# Patient Record
Sex: FEMALE | Race: WHITE | NOT HISPANIC OR LATINO | Employment: OTHER | URBAN - METROPOLITAN AREA
[De-identification: names, ages, dates, MRNs, and addresses within clinical notes are randomized per-mention and may not be internally consistent; named-entity substitution may affect disease eponyms.]

---

## 2019-02-07 ENCOUNTER — TRANSCRIBE ORDERS (OUTPATIENT)
Dept: LAB | Facility: CLINIC | Age: 84
End: 2019-02-07

## 2019-02-07 ENCOUNTER — APPOINTMENT (OUTPATIENT)
Dept: LAB | Facility: CLINIC | Age: 84
End: 2019-02-07

## 2019-02-07 DIAGNOSIS — Z01.84 IMMUNITY STATUS TESTING: Primary | ICD-10-CM

## 2019-02-07 DIAGNOSIS — Z11.1 SCREENING EXAMINATION FOR PULMONARY TUBERCULOSIS: ICD-10-CM

## 2019-02-07 DIAGNOSIS — Z01.84 IMMUNITY STATUS TESTING: ICD-10-CM

## 2019-02-07 LAB — RUBV IGG SERPL IA-ACNC: >175 IU/ML

## 2019-02-07 PROCEDURE — 36415 COLL VENOUS BLD VENIPUNCTURE: CPT

## 2019-02-07 PROCEDURE — 86787 VARICELLA-ZOSTER ANTIBODY: CPT

## 2019-02-07 PROCEDURE — 86765 RUBEOLA ANTIBODY: CPT

## 2019-02-07 PROCEDURE — 86762 RUBELLA ANTIBODY: CPT

## 2019-02-07 PROCEDURE — 86735 MUMPS ANTIBODY: CPT

## 2019-02-07 PROCEDURE — 86480 TB TEST CELL IMMUN MEASURE: CPT

## 2019-02-11 LAB
GAMMA INTERFERON BACKGROUND BLD IA-ACNC: 0.04 IU/ML
M TB IFN-G BLD-IMP: NEGATIVE
M TB IFN-G CD4+ BCKGRND COR BLD-ACNC: 0 IU/ML
M TB IFN-G CD4+ BCKGRND COR BLD-ACNC: 0 IU/ML
MITOGEN IGNF BCKGRD COR BLD-ACNC: >10 IU/ML
VZV IGG SER IA-ACNC: NORMAL

## 2019-02-12 LAB
MEV IGG SER QL: NORMAL
MUV IGG SER QL: NORMAL

## 2021-01-25 ENCOUNTER — IMMUNIZATIONS (OUTPATIENT)
Dept: FAMILY MEDICINE CLINIC | Facility: HOSPITAL | Age: 86
End: 2021-01-25

## 2021-01-25 DIAGNOSIS — Z23 ENCOUNTER FOR IMMUNIZATION: Primary | ICD-10-CM

## 2021-01-25 PROCEDURE — 91301 SARS-COV-2 / COVID-19 MRNA VACCINE (MODERNA) 100 MCG: CPT

## 2021-01-25 PROCEDURE — 0011A SARS-COV-2 / COVID-19 MRNA VACCINE (MODERNA) 100 MCG: CPT

## 2021-02-20 ENCOUNTER — IMMUNIZATIONS (OUTPATIENT)
Dept: FAMILY MEDICINE CLINIC | Facility: HOSPITAL | Age: 86
End: 2021-02-20

## 2021-02-20 DIAGNOSIS — Z23 ENCOUNTER FOR IMMUNIZATION: Primary | ICD-10-CM

## 2021-02-20 PROCEDURE — 0012A SARS-COV-2 / COVID-19 MRNA VACCINE (MODERNA) 100 MCG: CPT

## 2021-02-20 PROCEDURE — 91301 SARS-COV-2 / COVID-19 MRNA VACCINE (MODERNA) 100 MCG: CPT

## 2022-01-03 DIAGNOSIS — Z11.59 SCREENING FOR VIRAL DISEASE: Primary | ICD-10-CM

## 2022-01-03 PROCEDURE — U0005 INFEC AGEN DETEC AMPLI PROBE: HCPCS | Performed by: INTERNAL MEDICINE

## 2022-01-03 PROCEDURE — U0003 INFECTIOUS AGENT DETECTION BY NUCLEIC ACID (DNA OR RNA); SEVERE ACUTE RESPIRATORY SYNDROME CORONAVIRUS 2 (SARS-COV-2) (CORONAVIRUS DISEASE [COVID-19]), AMPLIFIED PROBE TECHNIQUE, MAKING USE OF HIGH THROUGHPUT TECHNOLOGIES AS DESCRIBED BY CMS-2020-01-R: HCPCS | Performed by: INTERNAL MEDICINE

## 2022-12-08 ENCOUNTER — APPOINTMENT (OUTPATIENT)
Dept: LAB | Facility: CLINIC | Age: 87
End: 2022-12-08

## 2022-12-08 DIAGNOSIS — J06.9 ACUTE RESPIRATORY DISEASE: ICD-10-CM

## 2022-12-09 LAB
FLUAV RNA RESP QL NAA+PROBE: NEGATIVE
FLUBV RNA RESP QL NAA+PROBE: NEGATIVE
SARS-COV-2 RNA RESP QL NAA+PROBE: NEGATIVE

## 2023-03-10 ENCOUNTER — HOSPITAL ENCOUNTER (OUTPATIENT)
Dept: ULTRASOUND IMAGING | Facility: HOSPITAL | Age: 88
Discharge: HOME | End: 2023-03-10

## 2023-03-10 DIAGNOSIS — N39.3 FEMALE STRESS INCONTINENCE: ICD-10-CM

## 2023-10-23 ENCOUNTER — OFFICE VISIT (OUTPATIENT)
Dept: UROLOGY | Facility: CLINIC | Age: 88
End: 2023-10-23
Payer: MEDICARE

## 2023-10-23 VITALS
RESPIRATION RATE: 18 BRPM | SYSTOLIC BLOOD PRESSURE: 132 MMHG | HEART RATE: 112 BPM | WEIGHT: 142 LBS | OXYGEN SATURATION: 97 % | DIASTOLIC BLOOD PRESSURE: 82 MMHG | HEIGHT: 59 IN | BODY MASS INDEX: 28.63 KG/M2

## 2023-10-23 DIAGNOSIS — R32 URINARY INCONTINENCE, UNSPECIFIED TYPE: Primary | ICD-10-CM

## 2023-10-23 LAB
SL AMB  POCT GLUCOSE, UA: NORMAL
SL AMB LEUKOCYTE ESTERASE,UA: NORMAL
SL AMB POCT BILIRUBIN,UA: NORMAL
SL AMB POCT BLOOD,UA: NORMAL
SL AMB POCT CLARITY,UA: CLEAR
SL AMB POCT COLOR,UA: YELLOW
SL AMB POCT KETONES,UA: NORMAL
SL AMB POCT NITRITE,UA: NORMAL
SL AMB POCT PH,UA: 5
SL AMB POCT SPECIFIC GRAVITY,UA: 1.01
SL AMB POCT URINE PROTEIN: NORMAL
SL AMB POCT UROBILINOGEN: 0.2

## 2023-10-23 PROCEDURE — 81002 URINALYSIS NONAUTO W/O SCOPE: CPT | Performed by: PHYSICIAN ASSISTANT

## 2023-10-23 PROCEDURE — 99203 OFFICE O/P NEW LOW 30 MIN: CPT | Performed by: PHYSICIAN ASSISTANT

## 2023-10-23 RX ORDER — METOPROLOL SUCCINATE 25 MG/1
25 TABLET, EXTENDED RELEASE ORAL 2 TIMES DAILY
COMMUNITY

## 2023-10-23 RX ORDER — ASPIRIN 81 MG/1
81 TABLET ORAL DAILY
COMMUNITY

## 2023-10-23 RX ORDER — SIMVASTATIN 40 MG
40 TABLET ORAL
COMMUNITY

## 2023-10-23 RX ORDER — ASCORBIC ACID 500 MG
500 TABLET ORAL DAILY
COMMUNITY

## 2023-12-29 ENCOUNTER — HOSPITAL ENCOUNTER (OUTPATIENT)
Dept: MRI IMAGING | Facility: HOSPITAL | Age: 88
End: 2023-12-29
Attending: INTERNAL MEDICINE
Payer: MEDICARE

## 2023-12-29 ENCOUNTER — HOSPITAL ENCOUNTER (OUTPATIENT)
Dept: MRI IMAGING | Facility: HOSPITAL | Age: 88
End: 2023-12-29
Payer: MEDICARE

## 2023-12-29 ENCOUNTER — HOSPITAL ENCOUNTER (EMERGENCY)
Facility: HOSPITAL | Age: 88
End: 2023-12-29
Attending: EMERGENCY MEDICINE
Payer: MEDICARE

## 2023-12-29 ENCOUNTER — HOSPITAL ENCOUNTER (INPATIENT)
Facility: HOSPITAL | Age: 88
LOS: 2 days | Discharge: HOME | End: 2023-12-31
Attending: INTERNAL MEDICINE | Admitting: INTERNAL MEDICINE
Payer: MEDICARE

## 2023-12-29 VITALS
DIASTOLIC BLOOD PRESSURE: 64 MMHG | TEMPERATURE: 98.1 F | OXYGEN SATURATION: 98 % | RESPIRATION RATE: 16 BRPM | HEART RATE: 64 BPM | SYSTOLIC BLOOD PRESSURE: 148 MMHG

## 2023-12-29 DIAGNOSIS — R94.31 ABNORMAL ELECTROCARDIOGRAM (ECG) (EKG): ICD-10-CM

## 2023-12-29 DIAGNOSIS — G45.9 INTERMITTENT CEREBRAL ISCHEMIA: ICD-10-CM

## 2023-12-29 DIAGNOSIS — I63.9 STROKE (HCC): Primary | ICD-10-CM

## 2023-12-29 DIAGNOSIS — I48.91 ATRIAL FIBRILLATION (HCC): ICD-10-CM

## 2023-12-29 PROBLEM — I49.3 VENTRICULAR PREMATURE DEPOLARIZATION: Status: ACTIVE | Noted: 2020-01-14

## 2023-12-29 PROBLEM — E78.5 HYPERLIPIDEMIA: Status: ACTIVE | Noted: 2020-01-14

## 2023-12-29 PROBLEM — I35.9 AORTIC VALVE DISORDER: Status: ACTIVE | Noted: 2020-01-14

## 2023-12-29 PROBLEM — I10 ESSENTIAL HYPERTENSION: Status: ACTIVE | Noted: 2020-01-14

## 2023-12-29 PROBLEM — I27.20 PULMONARY HYPERTENSION (HCC): Status: ACTIVE | Noted: 2020-01-14

## 2023-12-29 LAB
ANION GAP SERPL CALCULATED.3IONS-SCNC: 7 MMOL/L
APTT PPP: 29 SECONDS (ref 23–37)
BASOPHILS # BLD AUTO: 0.02 THOUSANDS/ÂΜL (ref 0–0.1)
BASOPHILS NFR BLD AUTO: 0 % (ref 0–1)
BUN SERPL-MCNC: 20 MG/DL (ref 5–25)
CALCIUM SERPL-MCNC: 10.2 MG/DL (ref 8.4–10.2)
CHLORIDE SERPL-SCNC: 102 MMOL/L (ref 96–108)
CO2 SERPL-SCNC: 30 MMOL/L (ref 21–32)
CREAT SERPL-MCNC: 1.1 MG/DL (ref 0.6–1.3)
EOSINOPHIL # BLD AUTO: 0.05 THOUSAND/ÂΜL (ref 0–0.61)
EOSINOPHIL NFR BLD AUTO: 1 % (ref 0–6)
ERYTHROCYTE [DISTWIDTH] IN BLOOD BY AUTOMATED COUNT: 13.1 % (ref 11.6–15.1)
GFR SERPL CREATININE-BSD FRML MDRD: 45 ML/MIN/1.73SQ M
GLUCOSE SERPL-MCNC: 94 MG/DL (ref 65–140)
HCT VFR BLD AUTO: 41.2 % (ref 34.8–46.1)
HGB BLD-MCNC: 13.3 G/DL (ref 11.5–15.4)
IMM GRANULOCYTES # BLD AUTO: 0.02 THOUSAND/UL (ref 0–0.2)
IMM GRANULOCYTES NFR BLD AUTO: 0 % (ref 0–2)
INR PPP: 1.01 (ref 0.84–1.19)
LYMPHOCYTES # BLD AUTO: 1.72 THOUSANDS/ÂΜL (ref 0.6–4.47)
LYMPHOCYTES NFR BLD AUTO: 28 % (ref 14–44)
MAGNESIUM SERPL-MCNC: 2.1 MG/DL (ref 1.9–2.7)
MCH RBC QN AUTO: 31.9 PG (ref 26.8–34.3)
MCHC RBC AUTO-ENTMCNC: 32.3 G/DL (ref 31.4–37.4)
MCV RBC AUTO: 99 FL (ref 82–98)
MONOCYTES # BLD AUTO: 0.81 THOUSAND/ÂΜL (ref 0.17–1.22)
MONOCYTES NFR BLD AUTO: 13 % (ref 4–12)
NEUTROPHILS # BLD AUTO: 3.58 THOUSANDS/ÂΜL (ref 1.85–7.62)
NEUTS SEG NFR BLD AUTO: 58 % (ref 43–75)
NRBC BLD AUTO-RTO: 0 /100 WBCS
PLATELET # BLD AUTO: 162 THOUSANDS/UL (ref 149–390)
PMV BLD AUTO: 10 FL (ref 8.9–12.7)
POTASSIUM SERPL-SCNC: 4.2 MMOL/L (ref 3.5–5.3)
PROTHROMBIN TIME: 13.2 SECONDS (ref 11.6–14.5)
RBC # BLD AUTO: 4.17 MILLION/UL (ref 3.81–5.12)
SODIUM SERPL-SCNC: 139 MMOL/L (ref 135–147)
WBC # BLD AUTO: 6.2 THOUSAND/UL (ref 4.31–10.16)

## 2023-12-29 PROCEDURE — 85610 PROTHROMBIN TIME: CPT | Performed by: EMERGENCY MEDICINE

## 2023-12-29 PROCEDURE — 36415 COLL VENOUS BLD VENIPUNCTURE: CPT | Performed by: EMERGENCY MEDICINE

## 2023-12-29 PROCEDURE — 83735 ASSAY OF MAGNESIUM: CPT | Performed by: EMERGENCY MEDICINE

## 2023-12-29 PROCEDURE — 93005 ELECTROCARDIOGRAM TRACING: CPT

## 2023-12-29 PROCEDURE — 99285 EMERGENCY DEPT VISIT HI MDM: CPT | Performed by: EMERGENCY MEDICINE

## 2023-12-29 PROCEDURE — 85730 THROMBOPLASTIN TIME PARTIAL: CPT | Performed by: EMERGENCY MEDICINE

## 2023-12-29 PROCEDURE — 99223 1ST HOSP IP/OBS HIGH 75: CPT

## 2023-12-29 PROCEDURE — 80048 BASIC METABOLIC PNL TOTAL CA: CPT | Performed by: EMERGENCY MEDICINE

## 2023-12-29 PROCEDURE — 83036 HEMOGLOBIN GLYCOSYLATED A1C: CPT

## 2023-12-29 PROCEDURE — 99285 EMERGENCY DEPT VISIT HI MDM: CPT

## 2023-12-29 PROCEDURE — 85025 COMPLETE CBC W/AUTO DIFF WBC: CPT | Performed by: EMERGENCY MEDICINE

## 2023-12-29 PROCEDURE — 70544 MR ANGIOGRAPHY HEAD W/O DYE: CPT

## 2023-12-29 RX ORDER — SENNOSIDES 8.6 MG
1 TABLET ORAL DAILY
Status: DISCONTINUED | OUTPATIENT
Start: 2023-12-30 | End: 2023-12-31 | Stop reason: HOSPADM

## 2023-12-29 RX ORDER — PRAVASTATIN SODIUM 80 MG/1
80 TABLET ORAL
Status: DISCONTINUED | OUTPATIENT
Start: 2023-12-30 | End: 2023-12-31 | Stop reason: HOSPADM

## 2023-12-29 RX ORDER — ACETAMINOPHEN 325 MG/1
650 TABLET ORAL EVERY 6 HOURS PRN
Status: DISCONTINUED | OUTPATIENT
Start: 2023-12-29 | End: 2023-12-31 | Stop reason: HOSPADM

## 2023-12-29 RX ORDER — ASCORBIC ACID 500 MG
500 TABLET ORAL DAILY
Status: DISCONTINUED | OUTPATIENT
Start: 2023-12-30 | End: 2023-12-31 | Stop reason: HOSPADM

## 2023-12-29 RX ORDER — METOPROLOL SUCCINATE 25 MG/1
25 TABLET, EXTENDED RELEASE ORAL 2 TIMES DAILY
Status: DISCONTINUED | OUTPATIENT
Start: 2023-12-29 | End: 2023-12-31

## 2023-12-29 RX ORDER — ENOXAPARIN SODIUM 100 MG/ML
40 INJECTION SUBCUTANEOUS DAILY
Status: DISCONTINUED | OUTPATIENT
Start: 2023-12-30 | End: 2023-12-30 | Stop reason: DRUGHIGH

## 2023-12-29 RX ORDER — ONDANSETRON 2 MG/ML
4 INJECTION INTRAMUSCULAR; INTRAVENOUS EVERY 6 HOURS PRN
Status: DISCONTINUED | OUTPATIENT
Start: 2023-12-29 | End: 2023-12-31 | Stop reason: HOSPADM

## 2023-12-29 RX ORDER — MELATONIN
1000 DAILY
Status: DISCONTINUED | OUTPATIENT
Start: 2023-12-30 | End: 2023-12-31 | Stop reason: HOSPADM

## 2023-12-29 RX ORDER — MAGNESIUM HYDROXIDE/ALUMINUM HYDROXICE/SIMETHICONE 120; 1200; 1200 MG/30ML; MG/30ML; MG/30ML
30 SUSPENSION ORAL EVERY 6 HOURS PRN
Status: DISCONTINUED | OUTPATIENT
Start: 2023-12-29 | End: 2023-12-31 | Stop reason: HOSPADM

## 2023-12-29 RX ADMIN — METOPROLOL SUCCINATE 25 MG: 25 TABLET, EXTENDED RELEASE ORAL at 23:42

## 2023-12-30 ENCOUNTER — APPOINTMENT (INPATIENT)
Dept: VASCULAR ULTRASOUND | Facility: HOSPITAL | Age: 88
End: 2023-12-30
Payer: MEDICARE

## 2023-12-30 ENCOUNTER — APPOINTMENT (INPATIENT)
Dept: MRI IMAGING | Facility: HOSPITAL | Age: 88
End: 2023-12-30
Payer: MEDICARE

## 2023-12-30 ENCOUNTER — APPOINTMENT (INPATIENT)
Dept: NON INVASIVE DIAGNOSTICS | Facility: HOSPITAL | Age: 88
End: 2023-12-30
Payer: MEDICARE

## 2023-12-30 PROBLEM — G45.9 TIA (TRANSIENT ISCHEMIC ATTACK): Status: ACTIVE | Noted: 2023-12-30

## 2023-12-30 PROBLEM — I48.91 ATRIAL FIBRILLATION (HCC): Status: ACTIVE | Noted: 2023-12-30

## 2023-12-30 PROBLEM — I63.9 STROKE (HCC): Status: ACTIVE | Noted: 2023-12-30

## 2023-12-30 PROBLEM — R47.1 DYSARTHRIA: Status: ACTIVE | Noted: 2023-12-30

## 2023-12-30 LAB
ALBUMIN SERPL BCP-MCNC: 4.1 G/DL (ref 3.5–5)
ALP SERPL-CCNC: 56 U/L (ref 34–104)
ALT SERPL W P-5'-P-CCNC: 21 U/L (ref 7–52)
ANION GAP SERPL CALCULATED.3IONS-SCNC: 8 MMOL/L
AORTIC ROOT: 2.3 CM
AORTIC VALVE MEAN VELOCITY: 15.4 M/S
APICAL FOUR CHAMBER EJECTION FRACTION: 64 %
ASCENDING AORTA: 2.9 CM
AST SERPL W P-5'-P-CCNC: 25 U/L (ref 13–39)
ATRIAL RATE: 62 BPM
AV AREA BY CONTINUOUS VTI: 1.8 CM2
AV AREA PEAK VELOCITY: 1.6 CM2
AV LVOT MEAN GRADIENT: 3 MMHG
AV LVOT PEAK GRADIENT: 6 MMHG
AV MEAN GRADIENT: 11 MMHG
AV PEAK GRADIENT: 21 MMHG
AV VALVE AREA: 1.83 CM2
AV VELOCITY RATIO: 0.52
BASOPHILS # BLD AUTO: 0.04 THOUSANDS/ÂΜL (ref 0–0.1)
BASOPHILS NFR BLD AUTO: 1 % (ref 0–1)
BILIRUB SERPL-MCNC: 0.68 MG/DL (ref 0.2–1)
BUN SERPL-MCNC: 20 MG/DL (ref 5–25)
CALCIUM SERPL-MCNC: 9.4 MG/DL (ref 8.4–10.2)
CHLORIDE SERPL-SCNC: 105 MMOL/L (ref 96–108)
CHOLEST SERPL-MCNC: 164 MG/DL
CO2 SERPL-SCNC: 27 MMOL/L (ref 21–32)
CREAT SERPL-MCNC: 0.94 MG/DL (ref 0.6–1.3)
DOP CALC AO PEAK VEL: 2.27 M/S
DOP CALC AO VTI: 42.15 CM
DOP CALC LVOT AREA: 3.14 CM2
DOP CALC LVOT CARDIAC INDEX: 3.15 L/MIN/M2
DOP CALC LVOT CARDIAC OUTPUT: 5.14 L/MIN
DOP CALC LVOT DIAMETER: 2 CM
DOP CALC LVOT PEAK VEL VTI: 24.61 CM
DOP CALC LVOT PEAK VEL: 1.17 M/S
DOP CALC LVOT STROKE INDEX: 44.8 ML/M2
DOP CALC LVOT STROKE VOLUME: 77.28
E WAVE DECELERATION TIME: 190 MS
E/A RATIO: 1.47
EOSINOPHIL # BLD AUTO: 0.07 THOUSAND/ÂΜL (ref 0–0.61)
EOSINOPHIL NFR BLD AUTO: 1 % (ref 0–6)
ERYTHROCYTE [DISTWIDTH] IN BLOOD BY AUTOMATED COUNT: 13.2 % (ref 11.6–15.1)
EST. AVERAGE GLUCOSE BLD GHB EST-MCNC: 126 MG/DL
EST. AVERAGE GLUCOSE BLD GHB EST-MCNC: 128 MG/DL
FRACTIONAL SHORTENING: 29 (ref 28–44)
GFR SERPL CREATININE-BSD FRML MDRD: 54 ML/MIN/1.73SQ M
GLUCOSE SERPL-MCNC: 105 MG/DL (ref 65–140)
HBA1C MFR BLD: 6 %
HBA1C MFR BLD: 6.1 %
HCT VFR BLD AUTO: 41.9 % (ref 34.8–46.1)
HDLC SERPL-MCNC: 50 MG/DL
HGB BLD-MCNC: 13.6 G/DL (ref 11.5–15.4)
IMM GRANULOCYTES # BLD AUTO: 0.02 THOUSAND/UL (ref 0–0.2)
IMM GRANULOCYTES NFR BLD AUTO: 0 % (ref 0–2)
INTERVENTRICULAR SEPTUM IN DIASTOLE (PARASTERNAL SHORT AXIS VIEW): 0.9 CM
INTERVENTRICULAR SEPTUM: 0.9 CM (ref 0.6–1.1)
LAAS-AP2: 10.1 CM2
LAAS-AP4: 12.3 CM2
LDLC SERPL CALC-MCNC: 83 MG/DL (ref 0–100)
LEFT ATRIUM SIZE: 3.5 CM
LEFT ATRIUM VOLUME (MOD BIPLANE): 25 ML
LEFT ATRIUM VOLUME INDEX (MOD BIPLANE): 15.3 ML/M2
LEFT INTERNAL DIMENSION IN SYSTOLE: 2.2 CM (ref 2.1–4)
LEFT VENTRICULAR INTERNAL DIMENSION IN DIASTOLE: 3.1 CM (ref 3.5–6)
LEFT VENTRICULAR POSTERIOR WALL IN END DIASTOLE: 0.9 CM
LEFT VENTRICULAR STROKE VOLUME: 22 ML
LVSV (TEICH): 22 ML
LYMPHOCYTES # BLD AUTO: 1.9 THOUSANDS/ÂΜL (ref 0.6–4.47)
LYMPHOCYTES NFR BLD AUTO: 28 % (ref 14–44)
MAGNESIUM SERPL-MCNC: 2 MG/DL (ref 1.9–2.7)
MCH RBC QN AUTO: 31.9 PG (ref 26.8–34.3)
MCHC RBC AUTO-ENTMCNC: 32.5 G/DL (ref 31.4–37.4)
MCV RBC AUTO: 98 FL (ref 82–98)
MONOCYTES # BLD AUTO: 0.84 THOUSAND/ÂΜL (ref 0.17–1.22)
MONOCYTES NFR BLD AUTO: 12 % (ref 4–12)
MV E'TISSUE VEL-LAT: 8 CM/S
MV E'TISSUE VEL-SEP: 8 CM/S
MV PEAK A VEL: 0.76 M/S
MV PEAK E VEL: 112 CM/S
MV STENOSIS PRESSURE HALF TIME: 55 MS
MV VALVE AREA P 1/2 METHOD: 4
NEUTROPHILS # BLD AUTO: 4 THOUSANDS/ÂΜL (ref 1.85–7.62)
NEUTS SEG NFR BLD AUTO: 58 % (ref 43–75)
NRBC BLD AUTO-RTO: 0 /100 WBCS
P AXIS: 55 DEGREES
PHOSPHATE SERPL-MCNC: 3.3 MG/DL (ref 2.3–4.1)
PLATELET # BLD AUTO: 154 THOUSANDS/UL (ref 149–390)
PMV BLD AUTO: 9.9 FL (ref 8.9–12.7)
POTASSIUM SERPL-SCNC: 3.9 MMOL/L (ref 3.5–5.3)
PR INTERVAL: 162 MS
PROT SERPL-MCNC: 6.8 G/DL (ref 6.4–8.4)
QRS AXIS: 37 DEGREES
QRSD INTERVAL: 78 MS
QT INTERVAL: 390 MS
QTC INTERVAL: 395 MS
RA PRESSURE ESTIMATED: 3 MMHG
RBC # BLD AUTO: 4.26 MILLION/UL (ref 3.81–5.12)
RIGHT ATRIUM AREA SYSTOLE A4C: 9.5 CM2
RIGHT VENTRICLE ID DIMENSION: 2.5 CM
RV PSP: 43 MMHG
SL CV LEFT ATRIUM LENGTH A2C: 4.1 CM
SL CV LV EF: 60
SL CV PED ECHO LEFT VENTRICLE DIASTOLIC VOLUME (MOD BIPLANE) 2D: 38 ML
SL CV PED ECHO LEFT VENTRICLE SYSTOLIC VOLUME (MOD BIPLANE) 2D: 15 ML
SODIUM SERPL-SCNC: 140 MMOL/L (ref 135–147)
T WAVE AXIS: 60 DEGREES
T4 FREE SERPL-MCNC: 1.11 NG/DL (ref 0.61–1.12)
TR MAX PG: 40 MMHG
TR PEAK VELOCITY: 3.2 M/S
TRICUSPID ANNULAR PLANE SYSTOLIC EXCURSION: 1.7 CM
TRICUSPID VALVE PEAK REGURGITATION VELOCITY: 3.17 M/S
TRIGL SERPL-MCNC: 153 MG/DL
TSH SERPL DL<=0.05 MIU/L-ACNC: 4.8 UIU/ML (ref 0.45–4.5)
VENTRICULAR RATE: 62 BPM
WBC # BLD AUTO: 6.87 THOUSAND/UL (ref 4.31–10.16)

## 2023-12-30 PROCEDURE — 99223 1ST HOSP IP/OBS HIGH 75: CPT | Performed by: PSYCHIATRY & NEUROLOGY

## 2023-12-30 PROCEDURE — 99232 SBSQ HOSP IP/OBS MODERATE 35: CPT | Performed by: FAMILY MEDICINE

## 2023-12-30 PROCEDURE — 84439 ASSAY OF FREE THYROXINE: CPT | Performed by: NURSE PRACTITIONER

## 2023-12-30 PROCEDURE — 70552 MRI BRAIN STEM W/DYE: CPT

## 2023-12-30 PROCEDURE — 94760 N-INVAS EAR/PLS OXIMETRY 1: CPT

## 2023-12-30 PROCEDURE — 93306 TTE W/DOPPLER COMPLETE: CPT | Performed by: INTERNAL MEDICINE

## 2023-12-30 PROCEDURE — A9585 GADOBUTROL INJECTION: HCPCS | Performed by: FAMILY MEDICINE

## 2023-12-30 PROCEDURE — 83036 HEMOGLOBIN GLYCOSYLATED A1C: CPT | Performed by: NURSE PRACTITIONER

## 2023-12-30 PROCEDURE — 99223 1ST HOSP IP/OBS HIGH 75: CPT | Performed by: INTERNAL MEDICINE

## 2023-12-30 PROCEDURE — 85025 COMPLETE CBC W/AUTO DIFF WBC: CPT

## 2023-12-30 PROCEDURE — 83735 ASSAY OF MAGNESIUM: CPT

## 2023-12-30 PROCEDURE — 97165 OT EVAL LOW COMPLEX 30 MIN: CPT

## 2023-12-30 PROCEDURE — 84443 ASSAY THYROID STIM HORMONE: CPT

## 2023-12-30 PROCEDURE — 80053 COMPREHEN METABOLIC PANEL: CPT

## 2023-12-30 PROCEDURE — 84100 ASSAY OF PHOSPHORUS: CPT

## 2023-12-30 PROCEDURE — 97129 THER IVNTJ 1ST 15 MIN: CPT

## 2023-12-30 PROCEDURE — 93306 TTE W/DOPPLER COMPLETE: CPT

## 2023-12-30 PROCEDURE — 80061 LIPID PANEL: CPT

## 2023-12-30 RX ORDER — LABETALOL HYDROCHLORIDE 5 MG/ML
10 INJECTION, SOLUTION INTRAVENOUS ONCE
Status: COMPLETED | OUTPATIENT
Start: 2023-12-30 | End: 2023-12-30

## 2023-12-30 RX ORDER — GADOBUTROL 604.72 MG/ML
7 INJECTION INTRAVENOUS
Status: COMPLETED | OUTPATIENT
Start: 2023-12-30 | End: 2023-12-30

## 2023-12-30 RX ORDER — LANOLIN ALCOHOL/MO/W.PET/CERES
3 CREAM (GRAM) TOPICAL ONCE
Status: COMPLETED | OUTPATIENT
Start: 2023-12-30 | End: 2023-12-30

## 2023-12-30 RX ORDER — ENOXAPARIN SODIUM 100 MG/ML
30 INJECTION SUBCUTANEOUS DAILY
Status: DISCONTINUED | OUTPATIENT
Start: 2023-12-30 | End: 2023-12-31 | Stop reason: HOSPADM

## 2023-12-30 RX ADMIN — LABETALOL HYDROCHLORIDE 10 MG: 5 INJECTION, SOLUTION INTRAVENOUS at 02:26

## 2023-12-30 RX ADMIN — GADOBUTROL 7 ML: 604.72 INJECTION INTRAVENOUS at 11:52

## 2023-12-30 RX ADMIN — METOPROLOL SUCCINATE 25 MG: 25 TABLET, EXTENDED RELEASE ORAL at 09:05

## 2023-12-30 RX ADMIN — OXYCODONE HYDROCHLORIDE AND ACETAMINOPHEN 500 MG: 500 TABLET ORAL at 09:05

## 2023-12-30 RX ADMIN — ENOXAPARIN SODIUM 30 MG: 30 INJECTION SUBCUTANEOUS at 09:07

## 2023-12-30 RX ADMIN — Medication 3 MG: at 22:11

## 2023-12-30 RX ADMIN — ASPIRIN 81 MG: 81 TABLET, COATED ORAL at 09:05

## 2023-12-30 RX ADMIN — PRAVASTATIN SODIUM 80 MG: 80 TABLET ORAL at 17:27

## 2023-12-30 RX ADMIN — SENNOSIDES 8.6 MG: 8.6 TABLET, FILM COATED ORAL at 09:05

## 2023-12-30 RX ADMIN — Medication 1000 UNITS: at 09:05

## 2023-12-30 RX ADMIN — METOPROLOL SUCCINATE 25 MG: 25 TABLET, EXTENDED RELEASE ORAL at 20:00

## 2023-12-30 NOTE — ED NOTES
Transfer Information:   with [facility] @ [address] [address] Room 320  Dr. Cox accepting  Call report: [phone]     Jef Foley RN  12/29/23 2204

## 2023-12-30 NOTE — SPEECH THERAPY NOTE
Speech Language/Pathology  Speech/Language Pathology  Assessment    Patient Name: Chantelle Burgos  Today's Date: 12/30/2023     Problem List  Principal Problem:    Dysarthria  Active Problems:    Aortic valve disorder    Essential hypertension    Hyperlipidemia    Pulmonary hypertension (HCC)    Past Medical History  Past Medical History:   Diagnosis Date    Hypertension      Past Surgical History  Past Surgical History:   Procedure Laterality Date    CYSTOCELE REPAIR N/A      Pt is an 89 yo female transferred from [facility] for stroke work up. Family noted difficulty speaking- difficulty w/ finding words, word errors which last a few minutes.     Consult received for speech/swallow eval on stroke pathway.  Pt passed nsg swallow screen; tolerating regular diet w/o s/s dysphagia or aspiration. No speech/language deficits reported. NIH score is 0.    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 Recommend follow-up MRI brain with contrast to exclude underlying lesions.     No need for formal speech/swallow eval at this time. Reconsult if needed.    Carola Rollins MA CCC-SLP  Speech Pathologist  Available via  tiger text

## 2023-12-30 NOTE — PROGRESS NOTES
[facility]  Progress Note  Name: Chantelle Burgos I  MRN: [id number]  Unit/Bed#: S -01 I Date of Admission: 12/29/2023   Date of Service: 12/30/2023 I Hospital Day: 1    Assessment/Plan   Atrial fibrillation (HCC)  Assessment & Plan  Continue Metoprolol 25 mg BID   Telemetry   Cardiology consulted   Not currently anticoagulated outpatient.   Likely contributing to multifocal strokes.       Pulmonary hypertension (HCC)  Assessment & Plan  Stable per patient report  Follows up with cardiology as outpatient    Hyperlipidemia  Assessment & Plan  Takes prevastatin 80 mg daily at dinner, continue with home regime  Lipid panel ordered  Stable per patient report    Essential hypertension  Assessment & Plan  Stable per patient reports, follows closely with cardiology she states  Take metoprolol 25 mg XL BID, continue with home regimen  Monitor kidney function with daily BMP    Aortic valve disorder  Assessment & Plan  Follows with cardiology outpatient   EKG shows NSR HR in the 60's. Noted on telemetry sinus tachycardia.  Last Echo on file 10/22 shows 60 % EF and mild regurgitation in mitral and tricuspid and is mild sclerosis in the aortic valve.  Echo pending  Consult cardiology if there is significant changes on echocardiogram    * Dysarthria  Assessment & Plan  Transferred from [facility] on 12/29. Patient reports her family noticed difficulty speaking on the morning of 12/28, the episode lasted 2 minutes and went away.   NIHHS on assessment is 0, GCS 15, alert and oriented x4 pleasant female.   On 12/29 she visited her PCP who instructed the patient and family to seek further help. The patient was taken to [facility].   At [facility] an MRI of the brain was taken showing small subacute infarcts in left parietal lobe and left periventricular occipital region. Recommend follow-up MRI brain with contrast to exclude underlying lesions and mild chronic microangiopathy. An MRA of the brain  resulted in a normal MRA Brain with variant anatomy.  Patient was transferred to [place] to obtain an MRI w contrast of the brain and continue with stroke pathway.  MRI w contrast of the brain, Echo and carotid duplex pending  Monitor for aarhythmias, telemetry order in place.   Continue with aspirin 81 mg and atorvastatin daily, home regimen.  Neurology has been consulted  PT/OT/Speech evaluation in place  Neuro checks every 4 hours and NIHHS at discharge     Fall precautions ordered           VTE Pharmacologic Prophylaxis: VTE Score: 9 High Risk (Score >/= 5) - Pharmacological DVT Prophylaxis Ordered: enoxaparin (Lovenox). Sequential Compression Devices Ordered.    Mobility:      HLM Goal achieved. Continue to encourage appropriate mobility.    Patient Centered Rounds: I performed bedside rounds with nursing staff today.   Discussions with Specialists or Other Care Team Provider: Neurology note     Education and Discussions with Family / Patient: Updated  (daughter) at bedside.    Total Time Spent on Date of Encounter in care of patient: 35 mins. This time was spent on one or more of the following: performing physical exam; counseling and coordination of care; obtaining or reviewing history; documenting in the medical record; reviewing/ordering tests, medications or procedures; communicating with other healthcare professionals and discussing with patient's family/caregivers.    Current Length of Stay: 1 day(s)  Current Patient Status: Inpatient   Certification Statement: The patient will continue to require additional inpatient hospital stay due to Stroke workup  Discharge Plan: Anticipate discharge in 24-48 hrs to home.    Code Status: Level 1 - Full Code    Subjective:   Overall doing well.  Denies any weakness or speech difficulty.     Objective:     Vitals:   Temp (24hrs), Av °F (36.7 °C), Min:97.3 °F (36.3 °C), Max:98.3 °F (36.8 °C)    Temp:  [97.3 °F (36.3 °C)-98.3 °F (36.8 °C)] 98 °F  (36.7 °C)  HR:  [] 62  Resp:  [15-18] 18  BP: (111-190)/() 117/60  SpO2:  [95 %-98 %] 95 %  Body mass index is 29.89 kg/m².     Input and Output Summary (last 24 hours):   No intake or output data in the 24 hours ending 12/30/23 1245    Physical Exam:   Physical Exam  Vitals and nursing note reviewed.   Constitutional:       Appearance: Normal appearance. She is well-developed.   HENT:      Head: Normocephalic and atraumatic.   Eyes:      Extraocular Movements: Extraocular movements intact.      Pupils: Pupils are equal, round, and reactive to light.   Cardiovascular:      Rate and Rhythm: Normal rate and regular rhythm.   Pulmonary:      Effort: Pulmonary effort is normal.      Breath sounds: Normal breath sounds.   Abdominal:      General: Bowel sounds are normal.      Palpations: Abdomen is soft.   Musculoskeletal:      Cervical back: Normal range of motion.   Skin:     General: Skin is warm and dry.   Neurological:      General: No focal deficit present.      Mental Status: She is alert and oriented to person, place, and time.   Psychiatric:         Mood and Affect: Mood normal.         Speech: Speech normal.         Behavior: Behavior normal.         Additional Data:     Labs:  Results from last 7 days   Lab Units 12/30/23  0558   WBC Thousand/uL 6.87   HEMOGLOBIN g/dL 13.6   HEMATOCRIT % 41.9   PLATELETS Thousands/uL 154   NEUTROS PCT % 58   LYMPHS PCT % 28   MONOS PCT % 12   EOS PCT % 1     Results from last 7 days   Lab Units 12/30/23  0558   SODIUM mmol/L 140   POTASSIUM mmol/L 3.9   CHLORIDE mmol/L 105   CO2 mmol/L 27   BUN mg/dL 20   CREATININE mg/dL 0.94   ANION GAP mmol/L 8   CALCIUM mg/dL 9.4   ALBUMIN g/dL 4.1   TOTAL BILIRUBIN mg/dL 0.68   ALK PHOS U/L 56   ALT U/L 21   AST U/L 25   GLUCOSE RANDOM mg/dL 105     Results from last 7 days   Lab Units 12/29/23  1928   INR  1.01         Results from last 7 days   Lab Units 12/30/23  0558   HEMOGLOBIN A1C % 6.0*            Lines/Drains:  Invasive Devices       Peripheral Intravenous Line  Duration             Peripheral IV 12/29/23 Right Antecubital <1 day                      Telemetry:  Telemetry Orders (From admission, onward)               24 Hour Telemetry Monitoring  Continuous x 24 Hours (Telem)        Question:  Reason for 24 Hour Telemetry  Answer:  TIA/Suspected CVA/ Confirmed CVA                   Indication for Continued Telemetry Use: Acute CVA TIA suspected/Suspected CVA              Imaging: Reviewed radiology reports from this admission including: MRI brain    Recent Cultures (last 7 days):         Last 24 Hours Medication List:   Current Facility-Administered Medications   Medication Dose Route Frequency Provider Last Rate    acetaminophen  650 mg Oral Q6H PRN DARWIN Prabhakar      aluminum-magnesium hydroxide-simethicone  30 mL Oral Q6H PRN DARWIN Prabhakar      ascorbic acid  500 mg Oral Daily DARWIN Prabhakar      aspirin  81 mg Oral Daily DARWIN Prabhakar      cholecalciferol  1,000 Units Oral Daily DARWIN Prabhakar      enoxaparin  30 mg Subcutaneous Daily DARWIN Prabhakar      metoprolol succinate  25 mg Oral BID DARWIN Prabhakar      ondansetron  4 mg Intravenous Q6H PRN DARWIN Prabhakar      pravastatin  80 mg Oral Daily With Dinner DARWIN Prabhakar      senna  1 tablet Oral Daily DARWIN Prabhakar          Today, Patient Was Seen By: Ari Pool MD    **Please Note: This note may have been constructed using a voice recognition system.**

## 2023-12-30 NOTE — ASSESSMENT & PLAN NOTE
Stable per patient reports, follows closely with cardiology she states  Take metoprolol 25 mg XL BID, continue with home regimen  Re-assess anti-hypertensive medication if systolic continues in the 150's  Monitor kidney function with daily BMP

## 2023-12-30 NOTE — OCCUPATIONAL THERAPY NOTE
Occupational Therapy Evaluation+ Cognitive Evaluation     Patient Name: Chantelle Burgos  Today's Date: 12/30/2023  Problem List  Principal Problem:    Dysarthria  Active Problems:    Aortic valve disorder    Essential hypertension    Hyperlipidemia    Pulmonary hypertension (HCC)    Past Medical History  Past Medical History:   Diagnosis Date    Hypertension      Past Surgical History  Past Surgical History:   Procedure Laterality Date    CYSTOCELE REPAIR N/A       12/30/23 0840   Note Type   Note type Evaluation   Pain Assessment   Pain Assessment Tool 0-10   Pain Score No Pain   Restrictions/Precautions   Weight Bearing Precautions Per Order No   Home Living   Type of Home House   Home Layout   (2 level home with stairglide to 2nd floor)   Bathroom Shower/Tub Walk-in shower   Bathroom Toilet Standard   Bathroom Equipment Grab bars in shower   Additional Comments Patient ambulates w/o AD at baseline   Prior Function   Level of [place] Independent with ADLs;Independent with functional mobility;Independent with IADLS   Lives With Alone   Receives Help From Family   IADLs Independent with driving;Independent with meal prep;Independent with medication management   Falls in the last 6 months 0   Comments Per son in law patient is independent with all ADL and IADL. Continues to drive and cook and clean without assistance.   Lifestyle   Intrinsic Gratification Volunteers   Subjective   Subjective Per sonin law and patient patient is at her baseline   ADL   Eating Assistance 7  Independent   Grooming Assistance 7  Independent   UB Bathing Assistance 7  Independent   UB Dressing Assistance 7  Independent   LB Dressing Assistance 7  Independent   Toileting Assistance  7  Independent   Bed Mobility   Additional Comments Unable to assess patient received standing   Transfers   Sit to Stand 6  Modified independent   Stand to Sit 6  Modified independent   Functional Mobility   Functional Mobility 7  Independent    Additional Comments   (No AD used)   RUE Assessment   RUE Assessment WFL   LUE Assessment   LUE Assessment WFL   Cognition   Overall Cognitive Status WFL   Arousal/Participation Alert;Cooperative   Orientation Level Oriented X4   Following Commands Follows one step commands without difficulty   Cognition Assessment Tools ACLS   Assessment   Prognosis Fair   Assessment Patient evaluated by Occupational Therapy.  Patient admitted with Dysarthria.   MRI of the brain was taken showing small subacute infarcts in left parietal lobe and left periventricular occipital region. The patients occupational profile, medical and therapy history includes a expanded review of medical and/or therapy records and additional review of physical, cognitive, or psychosocial history related to current functional performance.  Comorbidities affecting functional mobility and ADLS include: hypertension.  Prior to admission, patient was independent with functional mobility without assistive device, independent with ADLS, independent with IADLS, and living alone in 2 story home with 0 steps to enter.  See above for performance levels.This evaluation requires clinical decision making of low complexity, because the patients presents with no comorbidities that affect occupational performance and required no modification of tasks or assistance with consideration of a limited number of treatment options.  The Barthel Index was used as a functional outcome tool presenting with a score of 95,  The patient's raw score on the AM-PAC Daily Activity Inpatient Short Form is 24. A raw score of greater than or equal to 19 suggests the patient may benefit from discharge to home. Please refer to the recommendation of the Occupational Therapist for safe discharge planning.Patient is currently performing ADL  transfers and functional mobility at baseline and does not require skilled Occupational Therapy at this time. Patient scored a 5.4 on the ACLS  indicating  that they may live alone and work in  a job with a wide margin of error.   Discharge Recommendation   Rehab Resource Intensity Level, OT No post-acute rehabilitation needs   AM-PAC Daily Activity Inpatient   Lower Body Dressing 4   Bathing 4   Toileting 4   Upper Body Dressing 4   Grooming 4   Eating 4   Daily Activity Raw Score 24   Daily Activity Standardized Score (Calc for Raw Score >=11) 57.54   AM-PAC Applied Cognition Inpatient   Following a Speech/Presentation 4   Understanding Ordinary Conversation 4   Taking Medications 4   Remembering Where Things Are Placed or Put Away 4   Remembering List of 4-5 Errands 3   Taking Care of Complicated Tasks 3   Applied Cognition Raw Score 22   Applied Cognition Standardized Score 47.83   Vicente Cognitive Level   Vicente Cognitive Level Score 5.4   Vicente Cognitive Level Comments Patient evaluated by Occupational Therapy.  Patient admitted with Dysarthria.   MRI of the brain was taken showing small subacute infarcts in left parietal lobe and left periventricular occipital region. The patients occupational profile, medical and therapy history includes a expanded review of medical and/or therapy records and additional review of physical, cognitive, or psychosocial history related to current functional performance.  Comorbidities affecting functional mobility and ADLS include: hypertension.  Prior to admission, patient was independent with functional mobility without assistive device, independent with ADLS, independent with IADLS, and living alone in 2 story home with 0 steps to enter.  See above for performance levels.This evaluation requires clinical decision making of low complexity, because the patients presents with no comorbidities that affect occupational performance and required no modification of tasks or assistance with consideration of a limited number of treatment options.  The Barthel Index was used as a functional outcome tool presenting with a score of 95,  The  patient's raw score on the AM-PAC Daily Activity Inpatient Short Form is 24. A raw score of greater than or equal to 19 suggests the patient may benefit from discharge to home. Please refer to the recommendation of the Occupational Therapist for safe discharge planning.Patient is currently performing ADL  transfers and functional mobility at baseline and does not require skilled Occupational Therapy at this time. Patient scored a 5.4 on the ACLS  indicating that they may live alone and work in  a job with a wide margin of error.   Barthel Index   Feeding 10   Bathing 5   Grooming Score 5   Dressing Score 10   Bladder Score 10   Bowels Score 10   Toilet Use Score 10   Transfers (Bed/Chair) Score 10   Mobility (Level Surface) Score 15   Stairs Score 10   Barthel Index Score 95   End of Consult   Patient Position at End of Consult Bedside chair   Nurse Communication Nurse aware of consult

## 2023-12-30 NOTE — ASSESSMENT & PLAN NOTE
Follows with cardiology outpatient   EKG shows NSR HR in the 60's. Noted on telemetry sinus tachycardia.  Last Echo on file 10/22 shows 60 % EF and mild regurgitation in mitral and tricuspid and is mild sclerosis in the aortic valve.  Echo has been ordered  Consult cardiology if there is significant changes on echocardiogram

## 2023-12-30 NOTE — PLAN OF CARE
Problem: Potential for Falls  Goal: Patient will remain free of falls  Description: INTERVENTIONS:  - Educate patient/family on patient safety including physical limitations  - Instruct patient to call for assistance with activity   - Consult OT/PT to assist with strengthening/mobility   - Keep Call bell within reach  - Keep bed low and locked with side rails adjusted as appropriate  - Keep care items and personal belongings within reach  - Initiate and maintain comfort rounds  - Make Fall Risk Sign visible to staff  - Offer Toileting every  Hours, in advance of need  - Initiate/Maintain alarm  - Obtain necessary fall risk management equipment:   - Apply yellow socks and bracelet for high fall risk patients  - Consider moving patient to room near nurses station  Outcome: Progressing     Problem: Neurological Deficit  Goal: Neurological status is stable or improving  Description: Interventions:  - Monitor and assess patient's level of consciousness, motor function, sensory function, and level of assistance needed for ADLs.   - Monitor and report changes from baseline. Collaborate with interdisciplinary team to initiate plan and implement interventions as ordered.   - Provide and maintain a safe environment.  - Consider seizure precautions.  - Consider fall precautions.  - Consider aspiration precautions.  - Consider bleeding precautions.  Outcome: Progressing     Problem: Activity Intolerance/Impaired Mobility  Goal: Mobility/activity is maintained at optimum level for patient  Description: Interventions:  - Assess and monitor patient  barriers to mobility and need for assistive/adaptive devices.  - Assess patient's emotional response to limitations.  - Collaborate with interdisciplinary team and initiate plans and interventions as ordered.  - Encourage independent activity per ability.  - Maintain proper body alignment.  - Perform active/passive rom as tolerated/ordered.  - Plan activities to conserve energy.  -  Turn patient as appropriate  Outcome: Progressing     Problem: Communication Impairment  Goal: Ability to express needs and understand communication  Description: Assess patient's communication skills and ability to understand information.  Patient will demonstrate use of effective communication techniques, alternative methods of communication and understanding even if not able to speak.     - Encourage communication and provide alternate methods of communication as needed.  - Collaborate with case management/ for discharge needs.  - Include patient/family/caregiver in decisions related to communication.  Outcome: Progressing     Problem: Potential for Aspiration  Goal: Non-ventilated patient's risk of aspiration is minimized  Description: Assess and monitor vital signs, respiratory status, and labs (WBC).  Monitor for signs of aspiration (tachypnea, cough, rales, wheezing, cyanosis, fever).    - Assess and monitor patient's ability to swallow.  - Place patient up in chair to eat if possible.  - HOB up at 90 degrees to eat if unable to get patient up into chair.  - Supervise patient during oral intake.   - Instruct patient/ family to take small bites.  - Instruct patient/ family to take small single sips when taking liquids.  - Follow patient-specific strategies generated by speech pathologist.  Outcome: Progressing  Goal: Ventilated patient's risk of aspiration is minimized  Description: Assess and monitor vital signs, respiratory status, airway cuff pressure, and labs (WBC).  Monitor for signs of aspiration (tachypnea, cough, rales, wheezing, cyanosis, fever).    - Elevate head of bed 30 degrees if patient has tube feeding.  - Monitor tube feeding.  Outcome: Progressing     Problem: Nutrition  Goal: Nutrition/Hydration status is improving  Description: Monitor and assess patient's nutrition/hydration status for malnutrition (ex- brittle hair, bruises, dry skin, pale skin and conjunctiva, muscle  wasting, smooth red tongue, and disorientation). Collaborate with interdisciplinary team and initiate plan and interventions as ordered.  Monitor patient's weight and dietary intake as ordered or per policy. Utilize nutrition screening tool and intervene per policy. Determine patient's food preferences and provide high-protein, high-caloric foods as appropriate.     - Assist patient with eating.  - Allow adequate time for meals.  - Encourage patient to take dietary supplement as ordered.  - Collaborate with clinical nutritionist.  - Include patient/family/caregiver in decisions related to nutrition.  Outcome: Progressing     Problem: Knowledge Deficit  Goal: Patient/family/caregiver demonstrates understanding of disease process, treatment plan, medications, and discharge instructions  Description: Complete learning assessment and assess knowledge base.  Interventions:  - Provide teaching at level of understanding  - Provide teaching via preferred learning methods  Outcome: Progressing

## 2023-12-30 NOTE — PLAN OF CARE
Problem: Potential for Falls  Goal: Patient will remain free of falls  Description: INTERVENTIONS:  - Educate patient/family on patient safety including physical limitations  - Instruct patient to call for assistance with activity   - Consult OT/PT to assist with strengthening/mobility   - Keep Call bell within reach  - Keep bed low and locked with side rails adjusted as appropriate  - Keep care items and personal belongings within reach  - Initiate and maintain comfort rounds  - Make Fall Risk Sign visible to staff  - Offer Toileting every 2 Hours, in advance of need  - Apply yellow socks and bracelet for high fall risk patients  - Consider moving patient to room near nurses station  Outcome: Progressing     Problem: Neurological Deficit  Goal: Neurological status is stable or improving  Description: Interventions:  - Monitor and assess patient's level of consciousness, motor function, sensory function, and level of assistance needed for ADLs.   - Monitor and report changes from baseline. Collaborate with interdisciplinary team to initiate plan and implement interventions as ordered.   - Provide and maintain a safe environment.  - Consider seizure precautions.  - Consider fall precautions.  - Consider aspiration precautions.  - Consider bleeding precautions.  Outcome: Progressing     Problem: Activity Intolerance/Impaired Mobility  Goal: Mobility/activity is maintained at optimum level for patient  Description: Interventions:  - Assess and monitor patient  barriers to mobility and need for assistive/adaptive devices.  - Assess patient's emotional response to limitations.  - Collaborate with interdisciplinary team and initiate plans and interventions as ordered.  - Encourage independent activity per ability.  - Maintain proper body alignment.  - Perform active/passive rom as tolerated/ordered.  - Plan activities to conserve energy.  - Turn patient as appropriate  Outcome: Progressing     Problem: Communication  Impairment  Goal: Ability to express needs and understand communication  Description: Assess patient's communication skills and ability to understand information.  Patient will demonstrate use of effective communication techniques, alternative methods of communication and understanding even if not able to speak.     - Encourage communication and provide alternate methods of communication as needed.  - Collaborate with case management/ for discharge needs.  - Include patient/family/caregiver in decisions related to communication.  Outcome: Progressing     Problem: Potential for Aspiration  Goal: Non-ventilated patient's risk of aspiration is minimized  Description: Assess and monitor vital signs, respiratory status, and labs (WBC).  Monitor for signs of aspiration (tachypnea, cough, rales, wheezing, cyanosis, fever).    - Assess and monitor patient's ability to swallow.  - Place patient up in chair to eat if possible.  - HOB up at 90 degrees to eat if unable to get patient up into chair.  - Supervise patient during oral intake.   - Instruct patient/ family to take small bites.  - Instruct patient/ family to take small single sips when taking liquids.  - Follow patient-specific strategies generated by speech pathologist.  Outcome: Progressing  Goal: Ventilated patient's risk of aspiration is minimized  Description: Assess and monitor vital signs, respiratory status, airway cuff pressure, and labs (WBC).  Monitor for signs of aspiration (tachypnea, cough, rales, wheezing, cyanosis, fever).    - Elevate head of bed 30 degrees if patient has tube feeding.  - Monitor tube feeding.  Outcome: Progressing

## 2023-12-30 NOTE — QUICK NOTE
Reported by bedside RN results from ordered EKG. EKG shows Atrial fibrillation with RVR, . Labetalol 10 mg one time administrated. Second EKG shows Atrial flutter with a HR of 120. Neurology was contacted due to MRI results showing small subacute infarcts in left parietal lobe and left periventricular occipital region and concern to start heparin drip in the setting of hemorrhagic conversion. Patient converted to NSR, HR 65. A consult to cardiology is in place.

## 2023-12-30 NOTE — EMTALA/ACUTE CARE TRANSFER
[facility] EMERGENCY DEPARTMENT  [address] [place] [id number]  Dept: [phone]      EMTALA TRANSFER CONSENT    NAME Chantelle Burgos                                         1935                              MRN [id number]    I have been informed of my rights regarding examination, treatment, and transfer   by Dr. Yelena Broderick MD    Benefits: Specialized equipment and/or services available at the receiving facility (Include comment)________________________ (MRI)    Risks: Increased discomfort during transfer      Consent for Transfer:  I acknowledge that my medical condition has been evaluated and explained to me by the emergency department physician or other qualified medical person and/or my attending physician, who has recommended that I be transferred to the service of  Accepting Physician: Dr. Cox at Accepting Facility Name, City & State : [facility]. The above potential benefits of such transfer, the potential risks associated with such transfer, and the probable risks of not being transferred have been explained to me, and I fully understand them.  The doctor has explained that, in my case, the benefits of transfer outweigh the risks.  I agree to be transferred.    I authorize the performance of emergency medical procedures and treatments upon me in both transit and upon arrival at the receiving facility.  Additionally, I authorize the release of any and all medical records to the receiving facility and request they be transported with me, if possible.  I understand that the safest mode of transportation during a medical emergency is an ambulance and that the Hospital advocates the use of this mode of transport. Risks of traveling to the receiving facility by car, including absence of medical control, life sustaining equipment, such as oxygen, and medical personnel has been explained to me and I fully understand them.    (THOMAS CORRECT BOX BELOW)  [  ]  I consent to the stated  transfer and to be transported by ambulance/helicopter.  [  ]  I consent to the stated transfer, but refuse transportation by ambulance and accept full responsibility for my transportation by car.  I understand the risks of non-ambulance transfers and I exonerate the Hospital and its staff from any deterioration in my condition that results from this refusal.    X___________________________________________    DATE  23  TIME________  Signature of patient or legally responsible individual signing on patient behalf           RELATIONSHIP TO PATIENT_________________________          Provider Certification    NAME Chantelle Burgos                                         1935                              MRN [id number]    A medical screening exam was performed on the above named patient.  Based on the examination:    Condition Necessitating Transfer The encounter diagnosis was Stroke (HCC).    Patient Condition: The patient has been stabilized such that within reasonable medical probability, no material deterioration of the patient condition or the condition of the unborn child(renuka) is likely to result from the transfer    Reason for Transfer: Level of Care needed not available at this facility    Transfer Requirements: Facility [facility]RA   Space available and qualified personnel available for treatment as acknowledged by    Agreed to accept transfer and to provide appropriate medical treatment as acknowledged by       Dr. Cox  Appropriate medical records of the examination and treatment of the patient are provided at the time of transfer   STAFF INITIAL WHEN COMPLETED _______  Transfer will be performed by qualified personnel from    and appropriate transfer equipment as required, including the use of necessary and appropriate life support measures.    Provider Certification: I have examined the patient and explained the following risks and benefits of being transferred/refusing transfer to the  patient/family:  General risk, such as traffic hazards, adverse weather conditions, rough terrain or turbulence, possible failure of equipment (including vehicle or aircraft), or consequences of actions of persons outside the control of the transport personnel      Based on these reasonable risks and benefits to the patient and/or the unborn child(renuka), and based upon the information available at the time of the patient’s examination, I certify that the medical benefits reasonably to be expected from the provision of appropriate medical treatments at another medical facility outweigh the increasing risks, if any, to the individual’s medical condition, and in the case of labor to the unborn child, from effecting the transfer.    X____________________________________________ DATE 12/29/23        TIME_______      ORIGINAL - SEND TO MEDICAL RECORDS   COPY - SEND WITH PATIENT DURING TRANSFER

## 2023-12-30 NOTE — ASSESSMENT & PLAN NOTE
Transferred from [facility] on 12/29. Patient reports her family noticed difficulty speaking on the morning of 12/28, the episode lasted 2 minutes and went away.   NIHHS on assessment is 0, GCS 15, alert and oriented x4 pleasant female.   On 12/29 she visited her PCP who instructed the patient and family to seek further help. The patient was taken to [facility].   At [facility] an MRI of the brain was taken showing small subacute infarcts in left parietal lobe and left periventricular occipital region. Recommend follow-up MRI brain with contrast to exclude underlying lesions and mild chronic microangiopathy. An MRA of the brain resulted in a normal MRA Brain with variant anatomy.  Patient was transferred to [place] to obtain an MRI w contrast of the brain and continue with stroke pathway.  MRI w contrast of the brain, Echo and carotid duplex pending  Monitor for aarhythmias, telemetry order in place.   Continue with aspirin 81 mg and atorvastatin daily, home regimen.  Neurology has been consulted  PT/OT/Speech evaluation in place  Neuro checks every 4 hours and NIHHS at discharge     Fall precautions ordered

## 2023-12-30 NOTE — ASSESSMENT & PLAN NOTE
88 y.o. R handed female with PMH of HTN, dyslipidemia, pulmonary hypertension who initially presented to [facility] on 12/29 and found to have infarcts on MR brain prompting overnight transfer to [place] and neurology consult. She had a 2 minute long episode of paraphasic errors prompting evaluation and discovery of strokes. Remembers the entire episode. At baseline, pt is completely independent, lives by herself, walks w/out any devices and takes care of ADLs by herself.     Initial /103--> 155/128, pulse 126--> 104.  Lab work showed normal BMP, CBC, elevated TSH (4.85), LDL 83. MR brain scan showed R, L hemispheric strokes; MRA head unremarkable for LVO, fetal L PCA, hypoplastic L P1.  ECG overnight showed A-fib with RVR; patient converted to NSR overnight.     Neuro exam shows R facial droop which corrects upon smiling (Pt tells me this may be due to her dentures being placed properly, but it appears the droop is persistent even after she adjusted her dentures. Tongue slightly skewed to the right.                         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 This may very well be cardioembolism due to newly discovered afib; reasonable to initiate AC. Her overall stroke burden is small and the strokes appear subacute- appropriate to start AC today. 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 Systemic AC use for a completely independent 88 y.o. patient with multifocal strokes, afib ([id number]) has significant benefit for prevention of future strokes as compared to bleed risk.    Plan:  BP goal: gradually reduce to normotension over days. MAP>65  Discontinue ASA  Start apixaban 5mg BID from neuro standpoint.   D/c ASA  Cont high intensity statin  TTE pending  VAS carotid study pending  PT/OT/PMR  STAT CTH in case of neuro exam changes  Rest of care per primary team

## 2023-12-30 NOTE — ASSESSMENT & PLAN NOTE
Follows with cardiology outpatient   EKG shows NSR HR in the 60's. Noted on telemetry sinus tachycardia.  Last Echo on file 10/22 shows 60 % EF and mild regurgitation in mitral and tricuspid and is mild sclerosis in the aortic valve.  Echo pending  Consult cardiology if there is significant changes on echocardiogram

## 2023-12-30 NOTE — ASSESSMENT & PLAN NOTE
Stable per patient reports, follows closely with cardiology she states  Take metoprolol 25 mg XL BID, continue with home regimen  Monitor kidney function with daily BMP

## 2023-12-30 NOTE — ASSESSMENT & PLAN NOTE
Transferred from [facility] on 12/29. Patient reports her family noticed difficulty speaking on the morning of 12/28, the episode lasted 2 minutes and went away.   NIHHS on assessment is 0, GCS 15, alert and oriented x4 pleasant female.   On 12/29 she visited her PCP who instructed the patient and family to seek further help. The patient was taken to [facility].   At [facility] an MRI of the brain was taken showing small subacute infarcts in left parietal lobe and left periventricular occipital region. Recommend follow-up MRI brain with contrast to exclude underlying lesions and mild chronic microangiopathy. An MRA of the brain resulted in a normal MRA Brain with variant anatomy.  Patient was transferred to [place] to obtain an MRI w contrast of the brain and continue with stroke pathway.  MRI w contrast of the brain, Echo and carotid duplex is ordered.  Monitor for aarhythmias, telemetry order in place.   Continue with aspirin 81 mg and atorvastatin daily, home regimen.  Neurology has been consulted  PT/OT/Speech evaluation in place  Neuro checks every 4 hours and NIHHS at discharge     Fall precautions ordered

## 2023-12-30 NOTE — ED PROVIDER NOTES
History  Chief Complaint   Patient presents with    Abnormal Lab     Patient had a 3min moment of confusio yesterday. An MRI was ordered by Dr. Carmen. Results showed a minor stroke. Patient has no confusion today      88-year-old female history of hyperlipidemia hypertension presenting due to abnormal MRI results today.  Patient had a transient episode of confusion yesterday lasting a few minutes.  She spoke with her PCP who arranged for her to have an outpatient MRI/MRA brain today.  The MRI was read as showing 2 small acute cerebral infarcts.  She was told to come into the ER for further workup and evaluation.  Patient is currently asymptomatic and feeling well.  No history of prior strokes.        Prior to Admission Medications   Prescriptions Last Dose Informant Patient Reported? Taking?   Cholecalciferol 50 MCG (2000 UT) CAPS  Self Yes No   Sig: Take 1 capsule by mouth   ascorbic acid (VITAMIN C) 500 MG tablet  Self Yes No   Sig: Take 500 mg by mouth daily   aspirin (ECOTRIN LOW STRENGTH) 81 mg EC tablet  Self Yes No   Sig: Take 81 mg by mouth daily   metoprolol succinate (TOPROL-XL) 25 mg 24 hr tablet   Yes No   Sig: Take 25 mg by mouth 2 (two) times a day   simvastatin (ZOCOR) 40 mg tablet   Yes No   Sig: Take 40 mg by mouth daily at bedtime      Facility-Administered Medications: None       History reviewed. No pertinent past medical history.    Past Surgical History:   Procedure Laterality Date    CYSTOCELE REPAIR N/A        Family History   Problem Relation Age of Onset    Heart attack Mother      I have reviewed and agree with the history as documented.    E-Cigarette/Vaping    E-Cigarette Use Never User      E-Cigarette/Vaping Substances    Nicotine No     THC No     CBD No     Flavoring No     Other No     Unknown No      Social History     Tobacco Use    Smoking status: Never    Smokeless tobacco: Never   Vaping Use    Vaping status: Never Used   Substance Use Topics    Alcohol use: Not Currently     Drug use: Never       Review of Systems   All other systems reviewed and are negative.      Physical Exam  Physical Exam  Constitutional:       General: She is not in acute distress.     Appearance: Normal appearance.   HENT:      Head: Normocephalic and atraumatic.      Mouth/Throat:      Mouth: Mucous membranes are moist.   Eyes:      Extraocular Movements: Extraocular movements intact.      Pupils: Pupils are equal, round, and reactive to light.   Cardiovascular:      Rate and Rhythm: Normal rate and regular rhythm.   Pulmonary:      Effort: Pulmonary effort is normal.   Musculoskeletal:         General: Normal range of motion.   Skin:     General: Skin is warm and dry.   Neurological:      General: No focal deficit present.      Mental Status: She is alert and oriented to person, place, and time.      Cranial Nerves: No cranial nerve deficit.      Motor: No weakness.      Gait: Gait normal.   Psychiatric:         Mood and Affect: Mood normal.         Behavior: Behavior normal.         Vital Signs  ED Triage Vitals   Temperature Pulse Respirations Blood Pressure SpO2   12/29/23 1824 12/29/23 1827 12/29/23 1827 12/29/23 1827 12/29/23 1827   98 °F (36.7 °C) 71 18 (!) 190/78 97 %      Temp Source Heart Rate Source Patient Position - Orthostatic VS BP Location FiO2 (%)   12/29/23 1824 12/29/23 1827 12/29/23 1827 12/29/23 1827 --   Oral Monitor Sitting Left arm       Pain Score       --                  Vitals:    12/29/23 1827 12/29/23 1830 12/29/23 1900   BP: (!) 190/78 (!) 190/78 165/73   Pulse: 71 63 64   Patient Position - Orthostatic VS: Sitting Sitting          Visual Acuity  Visual Acuity      Flowsheet Row Most Recent Value   L Pupil Size (mm) 3   R Pupil Size (mm) 3            ED Medications  Medications - No data to display    Diagnostic Studies  Results Reviewed       Procedure Component Value Units Date/Time    Basic metabolic panel [[id number]] Collected: 12/29/23 1928    Lab Status: Final result  Specimen: Blood from Arm, Right Updated: 12/29/23 1954     Sodium 139 mmol/L      Potassium 4.2 mmol/L      Chloride 102 mmol/L      CO2 30 mmol/L      ANION GAP 7 mmol/L      BUN 20 mg/dL      Creatinine 1.10 mg/dL      Glucose 94 mg/dL      Calcium 10.2 mg/dL      eGFR 45 ml/min/1.73sq m     Narrative:      National Kid[employer] Disease Foundation guidelines for Chronic Kidney Disease (CKD):     Stage 1 with normal or high GFR (GFR > 90 mL/min/1.73 square meters)    Stage 2 Mild CKD (GFR = 60-89 mL/min/1.73 square meters)    Stage 3A Moderate CKD (GFR = 45-59 mL/min/1.73 square meters)    Stage 3B Moderate CKD (GFR = 30-44 mL/min/1.73 square meters)    Stage 4 Severe CKD (GFR = 15-29 mL/min/1.73 square meters)    Stage 5 End Stage CKD (GFR <15 mL/min/1.73 square meters)  Note: GFR calculation is accurate only with a steady state creatinine    Magnesium [[id number]]  (Normal) Collected: 12/29/23 1928    Lab Status: Final result Specimen: Blood from Arm, Right Updated: 12/29/23 1954     Magnesium 2.1 mg/dL     Protime-INR [[id number]]  (Normal) Collected: 12/29/23 1928    Lab Status: Final result Specimen: Blood from Arm, Right Updated: 12/29/23 1944     Protime 13.2 seconds      INR 1.01    APTT [[id number]]  (Normal) Collected: 12/29/23 1928    Lab Status: Final result Specimen: Blood from Arm, Right Updated: 12/29/23 1944     PTT 29 seconds     CBC and differential [[id number]]  (Abnormal) Collected: 12/29/23 1928    Lab Status: Final result Specimen: Blood from Arm, Right Updated: 12/1936     WBC 6.20 Thousand/uL      RBC 4.17 Million/uL      Hemoglobin 13.3 g/dL      Hematocrit 41.2 %      MCV 99 fL      MCH 31.9 pg      MCHC 32.3 g/dL      RDW 13.1 %      MPV 10.0 fL      Platelets 162 Thousands/uL      nRBC 0 /100 WBCs      Neutrophils Relative 58 %      Immat GRANS % 0 %      Lymphocytes Relative 28 %      Monocytes Relative 13 %      Eosinophils Relative 1 %      Basophils Relative 0 %      Neutrophils Absolute  3.58 Thousands/µL      Immature Grans Absolute 0.02 Thousand/uL      Lymphocytes Absolute 1.72 Thousands/µL      Monocytes Absolute 0.81 Thousand/µL      Eosinophils Absolute 0.05 Thousand/µL      Basophils Absolute 0.02 Thousands/µL                    No orders to display              Procedures  Procedures         ED Course       88-year-old female with 2 acute small infarcts on MRI today.  On arrival her NIHSS score is 0.  Her blood pressure is 190 systolic.  She is in no acute distress.  Neuroexam is nonfocal.  Screening labs obtained show normal CBC, BMP, and coags.  I spoke with the stroke neurologist on-call who recommends that patient be admitted for usual stroke workup as well as an MRI brain with contrast which was recommended by radiology and the read for her MRI is done today.  As it is a Friday and we do not have weekend MRI at this [facility] we will plan to transfer patient to a different [facility] to complete her stroke workup in a timely manner.      Pt accepted to hospitalist service at [facility]. Will await transfer.                                 Medical Decision Making  Amount and/or Complexity of Data Reviewed  Labs: ordered.             Disposition  Final diagnoses:   Stroke (HCC)     Time reflects when diagnosis was documented in both MDM as applicable and the Disposition within this note       Time User Action Codes Description Comment    12/29/2023  8:05 PM Yelena Broderick Add [I63.9] Stroke (HCC)           ED Disposition       ED Disposition   Transfer to Another Facility-In Network    Condition   --    Date/Time   Fri Dec 29, 2023  8:04 PM    Comment   Chantelle Burgos should be transferred out to [place].               Follow-up Information    None         Patient's Medications   Discharge Prescriptions    No medications on file       No discharge procedures on file.    PDMP Review       None            ED Provider  Electronically Signed by             Yelena Broderick MD  12/29/23  2390

## 2023-12-30 NOTE — ASSESSMENT & PLAN NOTE
Continue Metoprolol 25 mg BID   Telemetry   Cardiology consulted   Not currently anticoagulated outpatient.   Likely contributing to multifocal strokes.

## 2023-12-30 NOTE — ASSESSMENT & PLAN NOTE
Takes prevastatin 80 mg daily at dinner, continue with home regime  Lipid panel ordered  Stable per patient report

## 2023-12-30 NOTE — H&P
[facility]  H&P  Name: Chantelle Burgos 88 y.o. female I MRN: [id number]  Unit/Bed#: S -01 I Date of Admission: 12/29/2023   Date of Service: 12/30/2023 I Hospital Day: 1      Assessment/Plan   * Dysarthria  Assessment & Plan  Transferred from [facility] on 12/29. Patient reports her family noticed difficulty speaking on the morning of 12/28, the episode lasted 2 minutes and went away.   NIHHS on assessment is 0, GCS 15, alert and oriented x4 pleasant female.   On 12/29 she visited her PCP who instructed the patient and family to seek further help. The patient was taken to [facility].   At [facility] an MRI of the brain was taken showing small subacute infarcts in left parietal lobe and left periventricular occipital region. Recommend follow-up MRI brain with contrast to exclude underlying lesions and mild chronic microangiopathy. An MRA of the brain resulted in a normal MRA Brain with variant anatomy.  Patient was transferred to [place] to obtain an MRI w contrast of the brain and continue with stroke pathway.  MRI w contrast of the brain, Echo and carotid duplex is ordered.  Monitor for aarhythmias, telemetry order in place.   Continue with aspirin 81 mg and atorvastatin daily, home regimen.  Neurology has been consulted  PT/OT/Speech evaluation in place  Neuro checks every 4 hours and NIHHS at discharge     Fall precautions ordered    Essential hypertension  Assessment & Plan  Stable per patient reports, follows closely with cardiology she states  Take metoprolol 25 mg XL BID, continue with home regimen  Re-assess anti-hypertensive medication if systolic continues in the 150's  Monitor kidney function with daily BMP    Hyperlipidemia  Assessment & Plan  Takes prevastatin 80 mg daily at dinner, continue with home regime  Lipid panel ordered  Stable per patient report    Aortic valve disorder  Assessment & Plan  Follows with cardiology outpatient   EKG shows NSR HR in the 60's.  Noted on telemetry sinus tachycardia.  Last Echo on file 10/22 shows 60 % EF and mild regurgitation in mitral and tricuspid and is mild sclerosis in the aortic valve.  Echo has been ordered  Consult cardiology if there is significant changes on echocardiogram    Pulmonary hypertension (HCC)  Assessment & Plan  Stable per patient report  Follows up with cardiology as outpatient           VTE Pharmacologic Prophylaxis: VTE Score: 9 High Risk (Score >/= 5) - Pharmacological DVT Prophylaxis Ordered: enoxaparin (Lovenox). Sequential Compression Devices Ordered.  Code Status: Level 1 - Full Code   Discussion with family: Updated  (friend) at bedside.    Anticipated Length of Stay: Patient will be admitted on an inpatient basis with an anticipated length of stay of greater than 2 midnights secondary to TIA and pending neurological consult.    Total Time Spent on Date of Encounter in care of patient: 60 mins. This time was spent on one or more of the following: performing physical exam; counseling and coordination of care; obtaining or reviewing history; documenting in the medical record; reviewing/ordering tests, medications or procedures; communicating with other healthcare professionals and discussing with patient's family/caregivers.    Chief Complaint: Dysarthria    History of Present Illness:  Chantelle Burgos is a 88 y.o. female with a PMH of HTN, HLD and pulmonary hypertension who presents with dysarthria at [facility]. 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 The patient states she woke up on 12/28 and was making her bed when a family member noted difficulty speaking. The patient says the symptoms went away after like around two minutes and she went on her day. On 12/29 she visited her PCP because she wanted to know more about what happened the day prior and he instructed the patient to go to the ER. The patient went to [facility]. In  there, the patient got an MRI of the head and shows a small subacute infarcts in left parietal lobe and left periventricular occipital region and mild chronic microangiopathy. She also got an MRA revealing a normal MRA Brain with variant anatomy. Blood is unremarkable and EKG normal. The patient was transferred to [place] for further workup. The patient reports dysarthria on 12/29 lasting for two minutes, she denies change of vision, drooping of the face, numbness, tingling, loss of consciousness, dizziness or any other symptoms than the stated above.     Review of Systems:  Review of Systems   Constitutional: Negative.    HENT: Negative.     Eyes: Negative.    Respiratory: Negative.     Cardiovascular: Negative.    Gastrointestinal: Negative.    Endocrine: Negative.    Genitourinary: Negative.    Musculoskeletal: Negative.    Allergic/Immunologic: Negative.    Neurological:  Positive for speech difficulty.   Hematological: Negative.    Psychiatric/Behavioral: Negative.         Past Medical and Surgical History:   Past Medical History:   Diagnosis Date    Hypertension        Past Surgical History:   Procedure Laterality Date    CYSTOCELE REPAIR N/A        Meds/Allergies:  Prior to Admission medications    Medication Sig Start Date End Date Taking? Authorizing Provider   ascorbic acid (VITAMIN C) 500 MG tablet Take 500 mg by mouth daily    Historical Provider, MD   aspirin (ECOTRIN LOW STRENGTH) 81 mg EC tablet Take 81 mg by mouth daily    Historical Provider, MD   Cholecalciferol 50 MCG (2000 UT) CAPS Take 1 capsule by mouth in the morning    Historical Provider, MD   metoprolol succinate (TOPROL-XL) 25 mg 24 hr tablet Take 25 mg by mouth 2 (two) times a day    Historical Provider, MD   simvastatin (ZOCOR) 40 mg tablet Take 40 mg by mouth daily at bedtime    Historical Provider, MD     I have reviewed home medications with patient personally.    Allergies:   Allergies   Allergen Reactions    Penicillins Hives     Sulfa Antibiotics Other (See Comments)     Lost her sense of smell       Social History:  Marital Status:    Occupation: retired but volunteer for [employer]   Patient Pre-hospital Living Situation: Home  Patient Pre-hospital Level of Mobility: walks  Patient Pre-hospital Diet Restrictions: None  Substance Use History:   Social History     Substance and Sexual Activity   Alcohol Use Not Currently     Social History     Tobacco Use   Smoking Status Never   Smokeless Tobacco Never     Social History     Substance and Sexual Activity   Drug Use Never       Family History:  Family History   Problem Relation Age of Onset    Heart attack Mother        Physical Exam:     Vitals:   Blood Pressure: 147/85 (12/30/23 0202)  Pulse: (!) 106 (12/30/23 0202)  Temperature: 98.1 °F (36.7 °C) (12/30/23 0202)  Temp Source: Oral (12/29/23 2300)  Respirations: 18 (12/29/23 2300)  SpO2: 95 % (12/30/23 0202)    Physical Exam  Vitals and nursing note reviewed.   Constitutional:       General: She is not in acute distress.     Appearance: She is well-developed.   HENT:      Head: Normocephalic and atraumatic.      Nose: Nose normal.      Mouth/Throat:      Mouth: Mucous membranes are moist.      Pharynx: Oropharynx is clear.   Eyes:      Conjunctiva/sclera: Conjunctivae normal.      Pupils: Pupils are equal, round, and reactive to light.   Cardiovascular:      Rate and Rhythm: Normal rate and regular rhythm.      Pulses: Normal pulses.      Heart sounds: Normal heart sounds. No murmur heard.  Pulmonary:      Effort: Pulmonary effort is normal. No respiratory distress.      Breath sounds: Normal breath sounds.   Abdominal:      General: Bowel sounds are normal.      Palpations: Abdomen is soft.      Tenderness: There is no abdominal tenderness.   Musculoskeletal:         General: No swelling. Normal range of motion.      Cervical back: Normal range of motion and neck supple.   Skin:     General: Skin is warm and dry.      Capillary  Refill: Capillary refill takes less than 2 seconds.   Neurological:      General: No focal deficit present.      Mental Status: She is alert and oriented to person, place, and time. Mental status is at baseline.   Psychiatric:         Mood and Affect: Mood normal.         Behavior: Behavior normal.          Additional Data:     Lab Results:  Results from last 7 days   Lab Units 12/29/23 1928   WBC Thousand/uL 6.20   HEMOGLOBIN g/dL 13.3   HEMATOCRIT % 41.2   PLATELETS Thousands/uL 162   NEUTROS PCT % 58   LYMPHS PCT % 28   MONOS PCT % 13*   EOS PCT % 1     Results from last 7 days   Lab Units 12/29/23  1928   SODIUM mmol/L 139   POTASSIUM mmol/L 4.2   CHLORIDE mmol/L 102   CO2 mmol/L 30   BUN mg/dL 20   CREATININE mg/dL 1.10   ANION GAP mmol/L 7   CALCIUM mg/dL 10.2   GLUCOSE RANDOM mg/dL 94     Results from last 7 days   Lab Units 12/29/23  1928   INR  1.01                   Lines/Drains:  Invasive Devices       Peripheral Intravenous Line  Duration             Peripheral IV 12/29/23 Right Antecubital <1 day                        Imaging: Reviewed radiology reports from this admission including: MRI brain  MRI Inpatient Order    (Results Pending)       EKG and Other Studies Reviewed on Admission:   EKG: NSR. HR 62.    ** Please Note: This note has been constructed using a voice recognition system. **

## 2023-12-30 NOTE — CONSULTS
Consultation - Cardiology Team 1  Chantelle Burgos 88 y.o. female MRN: [id number]  Unit/Bed#: S -01 Encounter: [id number]  12/30/23  8:14 AM    Assessment/ Plan:  1. Atrial fibrillation  - patient reports a 40-year history - never anticoagulated  - presenting ECG - NSR, HR 62, no signs of acute ischemia  - a.m ECG - A-fib with RVR,   - telemetry review - NSR, HR 60s, converted to NSR around 0400 this morning  - TSH 4.8   - echo (10/14/2022, [facility]) - LVEF 60 to 65%, grade 2 DD, mild MR, mild TR with estimated PASP 45.6 mmHg, mild aortic sclerosis; new echo ordered and pending  - [id number] 6 (age, sex, hypertension, stroke) - recommend systemic anticoagulation with Eliquis 5 mg twice daily for future stroke prevention; patient and family to discuss  - maintained on Toprol-XL at 25 mg twice daily outpatient in the setting of hypertension; continue for rate control  - check free T4, hemoglobin A1c  - can consider outpatient event monitor to assess burden - patient would like to discuss with outpatient [facility] cardiologist  - continue to monitor on telemetry    2. Acute CVA  - presenting with difficulty speaking + confusion on morning of 12/28/2023  - MRI brain - small subacute infarct in left parietal lobe + periventricular occipital region  - transferred to [place] for MRI brain with contrast   - VAS carotid study pending  - continue aspirin, statin  - neurology consulted    3. Hypertension  - BP on arrival 158/103 - last documented at 117/50  - outpatient antihypertensive regimen - Toprol-XL 25 mg twice daily; continue    4. Hyperlipidemia  - lipid panel today -/triglycerides 153/HDL 50/LDL calculated 83  - maintained on simvastatin 40 mg daily outpatient - currently on pravastatin 80 mg daily as substitute    History of Present Illness   Physician Requesting Consult: Ari Pool, *    Reason for Consult / Principal Problem: Atrial fibrillation    HPI: Chantelle Burgos is a 88 y.o. female with  past medical history of hyperlipidemia and hypertension who initially presented to [facility] yesterday with a transient episode of confusion and difficulty speaking.  Patient called her PCP who arranged for an outpatient brain MRI that revealed 2 small acute cerebral infarcts.  She was instructed to present to the ED for further workup and evaluation.  Upon arrival to [facility], presenting ECG demonstrated a normal sinus rhythm with heart rate of 62.  Patient was then transferred to [facility] for MRI brain with contrast and neurology consultation.  Earlier this morning, an ECG was obtained that demonstrated atrial fibrillation with RVR and telemetry revealed the same last evening. She was given labetalol 10 mg x 1 in ED with no improvement in heart rate and cardiology was consulted for further recommendations and management.    Upon examination, patient is lying comfortably in bed with her daughter at bedside.  Reports that she feels back to her baseline.  From a heart standpoint, patient denies any symptoms of chest pain/pressure, shortness of breath, lightheadedness, dizziness, palpitations, nausea, vomiting, diaphoresis, or prior syncopal episodes.  She reports being diagnosed with atrial fibrillation approximately 40 years ago.  Notes that she was never placed on a blood thinner, and is not necessarily sure why.  Both patient and daughter are reluctant to begin a blood thinner presently, despite being educated on the importance of anticoagulation in the setting of a new stroke and atrial fibrillation.  They are to discuss as a family and make a decision later today.  Patient does not feel when she is in atrial fibrillation as she is grossly asymptomatic.  Patient denies any excessive caffeine intake or stimulant use.  She denies any drug, alcohol, or tobacco abuse.  She denies any signs of volume overload including orthopnea, abdominal bloating, or leg swelling.  Patient follows  with cardiology through [facility].    Inpatient consult to Cardiology  Consult performed by: DARWIN Martínez  Consult ordered by: DARWIN Prabhakar    EKG: A-fib with RVR,     Review of Systems   Constitutional:  Negative for chills, diaphoresis, fatigue and fever.   Eyes: Negative.    Respiratory:  Negative for chest tightness and shortness of breath.    Cardiovascular:  Negative for chest pain, palpitations and leg swelling.   Gastrointestinal: Negative.    Endocrine: Negative.    Genitourinary: Negative.    Musculoskeletal: Negative.    Allergic/Immunologic: Negative.    Neurological:  Positive for speech difficulty.   Hematological: Negative.    Psychiatric/Behavioral:  Positive for confusion.      Historical Information   Past Medical History:   Diagnosis Date    Hypertension      Past Surgical History:   Procedure Laterality Date    CYSTOCELE REPAIR N/A      Social History     Substance and Sexual Activity   Alcohol Use Not Currently     Social History     Substance and Sexual Activity   Drug Use Never     Social History     Tobacco Use   Smoking Status Never   Smokeless Tobacco Never     Family History:   Family History   Problem Relation Age of Onset    Heart attack Mother      Meds/Allergies   all current active meds have been reviewed  Allergies   Allergen Reactions    Penicillins Hives    Sulfa Antibiotics Other (See Comments)     Lost her sense of smell     Objective   Vitals: Blood pressure 117/50, pulse 62, temperature 98 °F (36.7 °C), resp. rate 18, weight 67.5 kg (148 lb 13 oz), SpO2 95%., Body mass index is 30.06 kg/m².,   Orthostatic Blood Pressures      Flowsheet Row Most Recent Value   Blood Pressure 117/50 filed at 2023 0612   Patient Position - Orthostatic VS Lying filed at 2023 0014          Systolic (24hrs), Av , Min:111 , Max:190     Diastolic (24hrs), Av, Min:50, Max:128    No intake or output data in the 24 hours ending 23 0814    Invasive Devices        Peripheral Intravenous Line  Duration             Peripheral IV 12/29/23 Right Antecubital <1 day                    Physical Exam  Constitutional:       General: She is not in acute distress.     Appearance: She is obese. She is not ill-appearing.   HENT:      Head: Normocephalic and atraumatic.      Nose: Nose normal.      Mouth/Throat:      Mouth: Mucous membranes are moist.      Pharynx: Oropharynx is clear.   Eyes:      Pupils: Pupils are equal, round, and reactive to light.   Cardiovascular:      Rate and Rhythm: Normal rate and regular rhythm.      Pulses: Normal pulses.      Heart sounds: No murmur heard.  Pulmonary:      Effort: Pulmonary effort is normal. No respiratory distress.      Breath sounds: No wheezing or rales.      Comments: On RA  Abdominal:      General: Bowel sounds are normal.      Palpations: Abdomen is soft.   Musculoskeletal:         General: Normal range of motion.      Cervical back: Normal range of motion.      Right lower leg: No edema.      Left lower leg: No edema.   Skin:     General: Skin is warm and dry.      Capillary Refill: Capillary refill takes less than 2 seconds.   Neurological:      Mental Status: She is alert and oriented to person, place, and time. Mental status is at baseline.   Psychiatric:         Mood and Affect: Mood normal.         Behavior: Behavior normal.     Lab Results:   Troponins:     CBC with diff:   Results from last 7 days   Lab Units 12/30/23  0558 12/29/23  1928   WBC Thousand/uL 6.87 6.20   HEMOGLOBIN g/dL 13.6 13.3   HEMATOCRIT % 41.9 41.2   MCV fL 98 99*   PLATELETS Thousands/uL 154 162   RBC Million/uL 4.26 4.17   MCH pg 31.9 31.9   MCHC g/dL 32.5 32.3   RDW % 13.2 13.1   MPV fL 9.9 10.0   NRBC AUTO /100 WBCs 0 0     CMP:   Results from last 7 days   Lab Units 12/30/23  0558 12/29/23  1928   POTASSIUM mmol/L 3.9 4.2   CHLORIDE mmol/L 105 102   CO2 mmol/L 27 30   BUN mg/dL 20 20   CREATININE mg/dL 0.94 1.10   CALCIUM mg/dL 9.4 10.2   AST U/L 25  --     ALT U/L 21  --    ALK PHOS U/L 56  --    EGFR ml/min/1.73sq m 54 45

## 2023-12-30 NOTE — CONSULTS
Neurology Consult Note    Name: Chantelle Burgos   Age & Sex: 88 y.o. female   MRN: [id number]  Unit/Bed#: S -01   Encounter: [id number]  Length of Stay: 1    Assessment plan:    Multifocal strokes  Assessment & Plan  88 y.o. R handed female with PMH of HTN, dyslipidemia, pulmonary hypertension who initially presented to [facility] on 12/29 and found to have infarcts on MR brain prompting overnight transfer to [place] and neurology consult. She had a 2 minute long episode of paraphasic errors prompting evaluation and discovery of strokes. Remembers the entire episode. At baseline, pt is completely independent, lives by herself, walks w/out any devices and takes care of ADLs by herself.     Initial /103--> 155/128, pulse 126--> 104.  Lab work showed normal BMP, CBC, elevated TSH (4.85), LDL 83. MR brain scan showed R, L hemispheric strokes; MRA head unremarkable for LVO, fetal L PCA, hypoplastic L P1.  ECG overnight showed A-fib with RVR; patient converted to NSR overnight.     Neuro exam shows R facial droop which corrects upon smiling (Pt tells me this may be due to her dentures being placed properly, but it appears the droop is persistent even after she adjusted her dentures. Tongue slightly skewed to the right.                         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 This may very well be cardioembolism due to newly discovered afib; reasonable to initiate AC. Her overall stroke burden is small and the strokes appear subacute- appropriate to start AC today. 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 Systemic AC use for a completely independent 88 y.o. patient with multifocal strokes, afib ([id number] 6) has significant benefit for prevention of future strokes as compared to bleed risk.    Plan:  BP goal: gradually reduce to normotension over days. MAP>65  Discontinue ASA  Start apixaban 5mg BID from neuro standpoint.    D/c ASA  Cont high intensity statin  TTE pending  VAS carotid study pending  PT/OT/PMR  STAT CTH in case of neuro exam changes  Rest of care per primary team       Follow-up with neurovascular attending/AP/resident clinic in 4 to 6 weeks after discharge.  Pending for discharge: per primary team     Subjective:    Reason for Consult / Principal Problem: Stroke  Hx and PE limited by: none    HPI: Chantelle Burogs is a 88 y.o. right handed female with pertinent PMH of HTN, dyslipidemia, pulmonary hypertension who initially presented to [facility] on 12/29 and found to have infarcts on MR brain prompting overnight transfer to [place] and neurology consult.    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 Walks independently w/out any devices and is able to take care of her ADLS by herself.  Patient awoke on 12/28 and was in her normal state of health until she was told by family member that she had difficulty speaking described as using the wrong words. She reports being completely aware of the episode. Episode lasted for 2 minutes and then resolved completely. She visited her PCP the next day who advised her to come to the hospital for further workup.    Never had such episodes in the past.  No antecedent infections, trauma, increased personal or professional stressors. No recent medication changes.   Denies smoking, alcohol use, recreational drug use.     Patient remains afebrile.  Initial /103--> 155/128, pulse 126--> 104.  Lab work showed normal BMP, CBC, elevated TSH (4.85), LDL 83. MR brain scan showed R, L hemispheric strokes; MRA head unremarkable for LVO, fetal L PCA, hypoplastic L P1.  ECG overnight showed A-fib with RVR; patient converted to NSR overnight.    She reports being back to baseline this morning.  Currently denies CP, SOB, palpitations, abdominal pain, nausea vomiting, fever, chills, headache, dizziness, new onset numbness/ tingling, new onset weakness, change in bowel/  bladder, difficulty speaking, difficulty swallowing, new vision changes.   Discussed current clinical status and plan with patient- verbalizes understanding.     Inpatient consult to Neurology  Consult performed by: Mao Romero MD  Consult ordered by: DARWIN Prabhakar          Historical Information   Past Medical History:   Diagnosis Date    Hypertension      Past Surgical History:   Procedure Laterality Date    CYSTOCELE REPAIR N/A      Social History   Social History     Substance and Sexual Activity   Alcohol Use Not Currently     Social History     Substance and Sexual Activity   Drug Use Never     E-Cigarette/Vaping    E-Cigarette Use Never User      E-Cigarette/Vaping Substances    Nicotine No     THC No     CBD No     Flavoring No     Other No     Unknown No      Social History     Tobacco Use   Smoking Status Never   Smokeless Tobacco Never     Family History:   Family History   Problem Relation Age of Onset    Heart attack Mother      Meds/Allergies   all current active meds have been reviewed, current meds:   Current Facility-Administered Medications   Medication Dose Route Frequency    acetaminophen (TYLENOL) tablet 650 mg  650 mg Oral Q6H PRN    aluminum-magnesium hydroxide-simethicone (MAALOX) oral suspension 30 mL  30 mL Oral Q6H PRN    ascorbic acid (VITAMIN C) tablet 500 mg  500 mg Oral Daily    aspirin (ECOTRIN LOW STRENGTH) EC tablet 81 mg  81 mg Oral Daily    cholecalciferol (VITAMIN D3) tablet 1,000 Units  1,000 Units Oral Daily    enoxaparin (LOVENOX) subcutaneous injection 30 mg  30 mg Subcutaneous Daily    metoprolol succinate (TOPROL-XL) 24 hr tablet 25 mg  25 mg Oral BID    ondansetron (ZOFRAN) injection 4 mg  4 mg Intravenous Q6H PRN    pravastatin (PRAVACHOL) tablet 80 mg  80 mg Oral Daily With Dinner    senna (SENOKOT) tablet 8.6 mg  1 tablet Oral Daily   , and PTA meds:   Prior to Admission Medications   Prescriptions Last Dose Informant Patient Reported? Taking?  "  Cholecalciferol 50 MCG (2000) CAPS  Self Yes No   Sig: Take 1 capsule by mouth in the morning   ascorbic acid (VITAMIN C) 500 MG tablet  Self Yes No   Sig: Take 500 mg by mouth daily   aspirin (ECOTRIN LOW STRENGTH) 81 mg EC tablet  Self Yes No   Sig: Take 81 mg by mouth daily   metoprolol succinate (TOPROL-XL) 25 mg 24 hr tablet   Yes No   Sig: Take 25 mg by mouth 2 (two) times a day   simvastatin (ZOCOR) 40 mg tablet   Yes No   Sig: Take 40 mg by mouth daily at bedtime      Facility-Administered Medications: None     Allergies   Allergen Reactions    Penicillins Hives    Sulfa Antibiotics Other (See Comments)     Lost her sense of smell       Review of previous medical records was completed.       Review of Systems see HPI     Objective:     Patient ID: Chantelle Burgos is a 88 y.o. female.    Vitals:   Vitals:    23 0411 23 0608 23 0612 23 0927   BP: 111/59  117/50 117/60   BP Location:       Pulse: 67  62 62   Resp:       Temp: (!) 97.3 °F (36.3 °C)  98 °F (36.7 °C)    TempSrc:       SpO2: 95%  95%    Weight:  67.5 kg (148 lb 13 oz)  67.1 kg (148 lb)   Height:    4' 11\" (1.499 m)      Body mass index is 29.89 kg/m².   No intake or output data in the 24 hours ending 23 1503    Temperature:   Temp (24hrs), Av °F (36.7 °C), Min:97.3 °F (36.3 °C), Max:98.3 °F (36.8 °C)    Temperature: 98 °F (36.7 °C)    Invasive Devices:   Invasive Devices       Peripheral Intravenous Line  Duration             Peripheral IV 23 Right Antecubital <1 day                    Physical Exam   Vitals reviewed  General Examination: No distress, cooperative.    Pulm: Normal effort.    Neurologic Exam   NCAT. NAD. Normal neck flexion and ROM.  AAOx3. Speech fluent without errors. Normal naming and repetition. Follows cross-body commands.  Pupils equal, briskly reactive. VFF. EOMI. No nystagmus. No dysarthria. Uvula midline. R facial droop which corrects upon smiling (Pt tells me this may be due to her " dentures being placed properly, but it appears the droop is persistent even after she adjusted her dentures.  Tongue slightly skewed to the Right.  Normal tone and bulk throughout.  Muscle strength testing by the MRC scale:   Right  Left  Site  Right  Left  Site    5 5 S Ab: Shoulder Abductors  5  5  HF: Hip Flexors    5 5 EF: Elbow Flexors  5  5 KF: Knee Flexors    5  5  EE: Elbow Extensors  5  5  KE: Knee Extensors    5  5  WE: Wrist Extensors  5  5 DR: Dorsi Flexors    5  5 FF: Finger Flexors  5  5 PF: Plantar Flexors      No drift or orbiting. No abnormal movements.     Symmetric LT sensation t/o. No extinction to DSS.  Reflexes:    RJ BJ TJ KJ Palma's   Right 2+ 2+ 2+ 2+ Not present   Left 2+ 2+ 2+ 2+ Not present     Intact FNF b/l. No truncal ataxia.  Gait: Able to get up without assistance. Normal casual gait. Does not need any devices.       Labs: I have personally reviewed pertinent reports.    Results from last 7 days   Lab Units 12/30/23 0558 12/29/23 1928   WBC Thousand/uL 6.87 6.20   HEMOGLOBIN g/dL 13.6 13.3   HEMATOCRIT % 41.9 41.2   PLATELETS Thousands/uL 154 162   NEUTROS PCT % 58 58   MONOS PCT % 12 13*   EOS PCT % 1 1      Results from last 7 days   Lab Units 12/30/23 0558 12/29/23 1928   POTASSIUM mmol/L 3.9 4.2   CHLORIDE mmol/L 105 102   CO2 mmol/L 27 30   BUN mg/dL 20 20   CREATININE mg/dL 0.94 1.10   CALCIUM mg/dL 9.4 10.2   ALK PHOS U/L 56  --    ALT U/L 21  --    AST U/L 25  --      Results from last 7 days   Lab Units 12/30/23 0558 12/29/23 1928   MAGNESIUM mg/dL 2.0 2.1     Results from last 7 days   Lab Units 12/30/23 0558   PHOSPHORUS mg/dL 3.3      Results from last 7 days   Lab Units 12/29/23 1928   INR  1.01   PTT seconds 29               Imaging and diagnostic studies:    Radiology Results: I have personally reviewed pertinent reports.   and I have personally reviewed pertinent films in PACS  MRI brain w contrast   Final Result by Rohan Todd MD (12/30 8252)      No abnormal  parenchymal or leptomeningeal enhancement identified. Correlate with recent noncontrast brain MRI for additional findings.      Workstation performed: [id number]         VAS carotid complete study    (Results Pending)       Other Diagnostic Testing: I have personally reviewed pertinent reports.      Active medications:  Current Facility-Administered Medications   Medication Dose Route Frequency    acetaminophen (TYLENOL) tablet 650 mg  650 mg Oral Q6H PRN    aluminum-magnesium hydroxide-simethicone (MAALOX) oral suspension 30 mL  30 mL Oral Q6H PRN    ascorbic acid (VITAMIN C) tablet 500 mg  500 mg Oral Daily    aspirin (ECOTRIN LOW STRENGTH) EC tablet 81 mg  81 mg Oral Daily    cholecalciferol (VITAMIN D3) tablet 1,000 Units  1,000 Units Oral Daily    enoxaparin (LOVENOX) subcutaneous injection 30 mg  30 mg Subcutaneous Daily    metoprolol succinate (TOPROL-XL) 24 hr tablet 25 mg  25 mg Oral BID    ondansetron (ZOFRAN) injection 4 mg  4 mg Intravenous Q6H PRN    pravastatin (PRAVACHOL) tablet 80 mg  80 mg Oral Daily With Dinner    senna (SENOKOT) tablet 8.6 mg  1 tablet Oral Daily       Prior to Admission medications    Medication Sig Start Date End Date Taking? Authorizing Provider   ascorbic acid (VITAMIN C) 500 MG tablet Take 500 mg by mouth daily    Historical Provider, MD   aspirin (ECOTRIN LOW STRENGTH) 81 mg EC tablet Take 81 mg by mouth daily    Historical Provider, MD   Cholecalciferol 50 MCG (2000 UT) CAPS Take 1 capsule by mouth in the morning    Historical Provider, MD   metoprolol succinate (TOPROL-XL) 25 mg 24 hr tablet Take 25 mg by mouth 2 (two) times a day    Historical Provider, MD   simvastatin (ZOCOR) 40 mg tablet Take 40 mg by mouth daily at bedtime    Historical Provider, MD         Code status and advanced directives:  Code Status: Level 1 - Full Code      VTE Pharmacologic Prophylaxis: Enoxaparin (Lovenox)  VTE Mechanical Prophylaxis: sequential compression device    ==  Reba  M.D.  PGY-3, Neurology

## 2023-12-31 ENCOUNTER — APPOINTMENT (INPATIENT)
Dept: VASCULAR ULTRASOUND | Facility: HOSPITAL | Age: 88
End: 2023-12-31
Payer: MEDICARE

## 2023-12-31 VITALS
BODY MASS INDEX: 27.16 KG/M2 | DIASTOLIC BLOOD PRESSURE: 54 MMHG | RESPIRATION RATE: 16 BRPM | TEMPERATURE: 97.3 F | OXYGEN SATURATION: 95 % | SYSTOLIC BLOOD PRESSURE: 126 MMHG | WEIGHT: 134.7 LBS | HEART RATE: 99 BPM | HEIGHT: 59 IN

## 2023-12-31 PROBLEM — I63.9 STROKE (CEREBRUM) (HCC): Status: ACTIVE | Noted: 2023-12-30

## 2023-12-31 LAB
ALBUMIN SERPL BCP-MCNC: 4 G/DL (ref 3.5–5)
ALP SERPL-CCNC: 52 U/L (ref 34–104)
ALT SERPL W P-5'-P-CCNC: 19 U/L (ref 7–52)
ANION GAP SERPL CALCULATED.3IONS-SCNC: 7 MMOL/L
AST SERPL W P-5'-P-CCNC: 22 U/L (ref 13–39)
BASOPHILS # BLD AUTO: 0.05 THOUSANDS/ÂΜL (ref 0–0.1)
BASOPHILS NFR BLD AUTO: 1 % (ref 0–1)
BILIRUB SERPL-MCNC: 0.71 MG/DL (ref 0.2–1)
BUN SERPL-MCNC: 23 MG/DL (ref 5–25)
CALCIUM SERPL-MCNC: 9.5 MG/DL (ref 8.4–10.2)
CHLORIDE SERPL-SCNC: 106 MMOL/L (ref 96–108)
CO2 SERPL-SCNC: 27 MMOL/L (ref 21–32)
CREAT SERPL-MCNC: 0.94 MG/DL (ref 0.6–1.3)
EOSINOPHIL # BLD AUTO: 0.13 THOUSAND/ÂΜL (ref 0–0.61)
EOSINOPHIL NFR BLD AUTO: 2 % (ref 0–6)
ERYTHROCYTE [DISTWIDTH] IN BLOOD BY AUTOMATED COUNT: 13.2 % (ref 11.6–15.1)
GFR SERPL CREATININE-BSD FRML MDRD: 54 ML/MIN/1.73SQ M
GLUCOSE SERPL-MCNC: 96 MG/DL (ref 65–140)
HCT VFR BLD AUTO: 40.9 % (ref 34.8–46.1)
HGB BLD-MCNC: 13.5 G/DL (ref 11.5–15.4)
IMM GRANULOCYTES # BLD AUTO: 0.02 THOUSAND/UL (ref 0–0.2)
IMM GRANULOCYTES NFR BLD AUTO: 0 % (ref 0–2)
LYMPHOCYTES # BLD AUTO: 2.12 THOUSANDS/ÂΜL (ref 0.6–4.47)
LYMPHOCYTES NFR BLD AUTO: 33 % (ref 14–44)
MCH RBC QN AUTO: 32.6 PG (ref 26.8–34.3)
MCHC RBC AUTO-ENTMCNC: 33 G/DL (ref 31.4–37.4)
MCV RBC AUTO: 99 FL (ref 82–98)
MONOCYTES # BLD AUTO: 0.88 THOUSAND/ÂΜL (ref 0.17–1.22)
MONOCYTES NFR BLD AUTO: 14 % (ref 4–12)
NEUTROPHILS # BLD AUTO: 3.17 THOUSANDS/ÂΜL (ref 1.85–7.62)
NEUTS SEG NFR BLD AUTO: 50 % (ref 43–75)
NRBC BLD AUTO-RTO: 0 /100 WBCS
PLATELET # BLD AUTO: 151 THOUSANDS/UL (ref 149–390)
PMV BLD AUTO: 9.9 FL (ref 8.9–12.7)
POTASSIUM SERPL-SCNC: 4 MMOL/L (ref 3.5–5.3)
PROT SERPL-MCNC: 6.6 G/DL (ref 6.4–8.4)
RBC # BLD AUTO: 4.14 MILLION/UL (ref 3.81–5.12)
SODIUM SERPL-SCNC: 140 MMOL/L (ref 135–147)
WBC # BLD AUTO: 6.37 THOUSAND/UL (ref 4.31–10.16)

## 2023-12-31 PROCEDURE — 93880 EXTRACRANIAL BILAT STUDY: CPT

## 2023-12-31 PROCEDURE — 80053 COMPREHEN METABOLIC PANEL: CPT | Performed by: FAMILY MEDICINE

## 2023-12-31 PROCEDURE — 99239 HOSP IP/OBS DSCHRG MGMT >30: CPT | Performed by: PHYSICIAN ASSISTANT

## 2023-12-31 PROCEDURE — 93880 EXTRACRANIAL BILAT STUDY: CPT | Performed by: SURGERY

## 2023-12-31 PROCEDURE — 85025 COMPLETE CBC W/AUTO DIFF WBC: CPT | Performed by: FAMILY MEDICINE

## 2023-12-31 PROCEDURE — 99232 SBSQ HOSP IP/OBS MODERATE 35: CPT | Performed by: INTERNAL MEDICINE

## 2023-12-31 RX ORDER — METOPROLOL TARTRATE 1 MG/ML
5 INJECTION, SOLUTION INTRAVENOUS ONCE
Status: COMPLETED | OUTPATIENT
Start: 2023-12-31 | End: 2023-12-31

## 2023-12-31 RX ORDER — METOPROLOL SUCCINATE 25 MG/1
25 TABLET, EXTENDED RELEASE ORAL ONCE
Status: COMPLETED | OUTPATIENT
Start: 2023-12-31 | End: 2023-12-31

## 2023-12-31 RX ORDER — METOPROLOL SUCCINATE 50 MG/1
50 TABLET, EXTENDED RELEASE ORAL 2 TIMES DAILY
Qty: 60 TABLET | Refills: 0 | Status: SHIPPED | OUTPATIENT
Start: 2023-12-31 | End: 2024-01-30

## 2023-12-31 RX ORDER — METOPROLOL SUCCINATE 50 MG/1
50 TABLET, EXTENDED RELEASE ORAL 2 TIMES DAILY
Status: DISCONTINUED | OUTPATIENT
Start: 2023-12-31 | End: 2023-12-31 | Stop reason: HOSPADM

## 2023-12-31 RX ORDER — PRAVASTATIN SODIUM 80 MG/1
80 TABLET ORAL
Qty: 30 TABLET | Refills: 0 | Status: SHIPPED | OUTPATIENT
Start: 2023-12-31 | End: 2024-01-30

## 2023-12-31 RX ADMIN — METOROPROLOL TARTRATE 5 MG: 5 INJECTION, SOLUTION INTRAVENOUS at 09:20

## 2023-12-31 RX ADMIN — Medication 1000 UNITS: at 08:11

## 2023-12-31 RX ADMIN — SENNOSIDES 8.6 MG: 8.6 TABLET, FILM COATED ORAL at 08:11

## 2023-12-31 RX ADMIN — ENOXAPARIN SODIUM 30 MG: 30 INJECTION SUBCUTANEOUS at 08:12

## 2023-12-31 RX ADMIN — ASPIRIN 81 MG: 81 TABLET, COATED ORAL at 08:11

## 2023-12-31 RX ADMIN — METOPROLOL SUCCINATE 25 MG: 25 TABLET, EXTENDED RELEASE ORAL at 08:11

## 2023-12-31 RX ADMIN — OXYCODONE HYDROCHLORIDE AND ACETAMINOPHEN 500 MG: 500 TABLET ORAL at 08:11

## 2023-12-31 RX ADMIN — METOPROLOL SUCCINATE 25 MG: 25 TABLET, EXTENDED RELEASE ORAL at 09:44

## 2023-12-31 NOTE — ASSESSMENT & PLAN NOTE
Follows with cardiology outpatient   [facility]      NEUROENDOCRINE TUMOR MULTIDISCIPLINARY TUMOR BOARD  _____________________________________________________________________    PRESENTER:   Christopher Basilio DO    REASON FOR PRESENTATION:  Treatment Plan    ATTENDEES:   Surgery:              MD ILYA Del Castillo MD T. 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    Neuroendocrine carcinoma of small bowel 2003    Secondary malignant neuroendocrine tumor of liver     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    Secondary neuroendocrine tumor of liver 10/2/2018       Past Surgical History:   Procedure Laterality Date    CARDIAC PACEMAKER PLACEMENT      pain pump  2005    x3     RFA liver tumors  2005, 2007, 2009    SBR  2006     _   Oncologic History Small bowel neuroendocrine tumor diagnosed 09/2003  Metastatic disease to liver at presentation  Metastatic disease to bone    Oncologic Treatment Small-bowel resection 02/2007  Laparoscopic radiofrequency ablation of the liver 08/2007  Laparoscopic radiofrequency ablation of the liver 06/2008  XRT to rib lesion 12/2017  Lanreotide 01/2018  Denosumab 05/2018     ______________________________________________________________    DISCUSSION:  Scan review: Unable to window or calculate SUVs, so my ability to compare is limited, but there do appear to be many more osseous lesions on the Ga-68 PET/CT dated  10/18/2018 than there were on 4/16/18, which would suggest progression. Hepatic lesions appear similar to prior. Greg and RLQ peritoneal mets appear improved.    BOARD RECOMMENDATIONS:     PRRT

## 2023-12-31 NOTE — PROGRESS NOTES
"Cardiology Progress Note - Chantelle Burgos 88 y.o. female MRN: [id number]    Unit/Bed#: S -01 Encounter: [id number]      Assessment/Recommendations:  1.  Atrial fibrillation: With prior history as well, never anticoagulated.  Heart rates controlled on beta-blocker, agree with higher dose now with recurrence of A-fib.  Would recommend anticoagulation considering acute CVA and elevated [id number] score of 6.  Patient is still deciding whether she would like to proceed with anticoagulation.  2.  Acute CVA: Likely secondary to atrial fibrillation.  Continued on aspirin and statin in the interim.  Neurology has said it is okay to start on systemic anticoagulation at the current time due to the fact that her strokes are small and subacute.  3.  Hypertension: Well-controlled on current regiment.  4.  Hyperlipidemia: Continue on statin.  5.  Stable from cardiac standpoint for discharge with anticoagulation, as recommended.  Outpatient follow-up recommended.    Subjective:   Patient seen and examined.  No significant events overnight.  ; pertinent negatives - chest pain, chest pressure/discomfort, dyspnea, irregular heart beat, near-syncope, and palpitations.    Objective:     Vitals: Blood pressure 138/82, pulse (!) 114, temperature (!) 97.3 °F (36.3 °C), resp. rate 16, height 4' 11\" (1.499 m), weight 61.1 kg (134 lb 11.2 oz), SpO2 95%., Body mass index is 27.21 kg/m².,   Orthostatic Blood Pressures      Flowsheet Row Most Recent Value   Blood Pressure 138/82 filed at 12/31/2023 0745   Patient Position - Orthostatic VS Lying filed at 12/30/2023 0014            No intake or output data in the 24 hours ending 12/31/23 0911    TELE: Atrial fibrillation with controlled ventricular response    Physical Exam:    GEN: Chantelle Burgos appears well, alert and oriented x 3, pleasant and cooperative   HEENT: pupils equal, round, and reactive to light; extraocular muscles intact  NECK: supple, no carotid bruits   HEART: Irregular " rhythm, normal S1 and S2, no murmurs, clicks, gallops or rubs   LUNGS: clear to auscultation bilaterally; no wheezes, rales, or rhonchi   ABDOMEN: normal bowel sounds, soft, no tenderness, no distention  EXTREMITIES: peripheral pulses normal; no clubbing, cyanosis, or edema  NEURO: no focal findings   SKIN: normal without suspicious lesions on exposed skin    Medications:      Current Facility-Administered Medications:     acetaminophen (TYLENOL) tablet 650 mg, 650 mg, Oral, Q6H PRN, DARWIN Prabhakar    aluminum-magnesium hydroxide-simethicone (MAALOX) oral suspension 30 mL, 30 mL, Oral, Q6H PRN, DARWIN Prabhakar    ascorbic acid (VITAMIN C) tablet 500 mg, 500 mg, Oral, Daily, DARWIN Prabhakar, 500 mg at 12/31/23 0811    aspirin (ECOTRIN LOW STRENGTH) EC tablet 81 mg, 81 mg, Oral, Daily, DARWIN Prabhakar, 81 mg at 12/31/23 0811    cholecalciferol (VITAMIN D3) tablet 1,000 Units, 1,000 Units, Oral, Daily, DARWIN Prabhakar, 1,000 Units at 12/31/23 0811    enoxaparin (LOVENOX) subcutaneous injection 30 mg, 30 mg, Subcutaneous, Daily, DARWIN Prabhakar, 30 mg at 12/31/23 0812    metoprolol succinate (TOPROL-XL) 24 hr tablet 25 mg, 25 mg, Oral, BID, DARWIN Prabhakar, 25 mg at 12/31/23 0811    ondansetron (ZOFRAN) injection 4 mg, 4 mg, Intravenous, Q6H PRN, DARWIN Prabhakar    pravastatin (PRAVACHOL) tablet 80 mg, 80 mg, Oral, Daily With Dinner, DARWIN Prabhakar, 80 mg at 12/30/23 1727    senna (SENOKOT) tablet 8.6 mg, 1 tablet, Oral, Daily, DARWIN Prabhakar, 8.6 mg at 12/31/23 0811     Labs & Results:        Results from last 7 days   Lab Units 12/31/23  0615 12/30/23  0558 12/29/23  1928   WBC Thousand/uL 6.37 6.87 6.20   HEMOGLOBIN g/dL 13.5 13.6 13.3   HEMATOCRIT % 40.9 41.9 41.2   PLATELETS Thousands/uL 151 154 162     Results from last 7 days   Lab Units 12/30/23  0558   TRIGLYCERIDES mg/dL 153*   HDL mg/dL 50     Results from last 7 days   Lab Units 12/31/23  0615 12/30/23  0558 12/29/23  1928    POTASSIUM mmol/L 4.0 3.9 4.2   CHLORIDE mmol/L 106 105 102   CO2 mmol/L 27 27 30   BUN mg/dL 23 20 20   CREATININE mg/dL 0.94 0.94 1.10   CALCIUM mg/dL 9.5 9.4 10.2   ALK PHOS U/L 52 56  --    ALT U/L 19 21  --    AST U/L 22 25  --      Results from last 7 days   Lab Units 12/29/23 1928   INR  1.01   PTT seconds 29     Results from last 7 days   Lab Units 12/30/23  0558 12/29/23 1928   MAGNESIUM mg/dL 2.0 2.1       Echo: personally reviewed -EF 60% with normal wall motion.  Mild aortic stenosis, mild pulmonary hypertension.    EKG personally reviewed by Waldemar Miranda MD.

## 2023-12-31 NOTE — DISCHARGE INSTR - AVS FIRST PAGE
Dear Chantelle Burgos,     It was our pleasure to care for you here at [facility].  It is our hope that we were always able to exceed the expected standards for your care during your stay.  You were hospitalized due to strokes.  You were cared for on the 3rd floor by Edgar Dubois PA-C under the service of Rere Smallwood MD with the [facility] who covers for your primary care physician (PCP), Buddy Carmen MD, while you were hospitalized.  If you have any questions or concerns related to this hospitalization, you may contact us at .  For follow up as well as any medication refills, we recommend that you follow up with your primary care physician.  A registered nurse will reach out to you by phone within a few days after your discharge to answer any additional questions that you may have after going home.  However, at this time we provide for you here, the most important instructions / recommendations at discharge:     Notable Medication Adjustments -   Please stop taking your aspirin.  Increase your metoprolol to 50 mg twice daily  Starting today please start taking Xarelto 20 mg daily  Important follow up information -   Please follow-up in a few weeks with your cardiologist and your family doctor.  Please review this entire after visit summary as additional general instructions including medication list, appointments, activity, diet, any pertinent wound care, and other additional recommendations from your care team that may be provided for you.      Sincerely,     Edgar Dubois PA-C

## 2023-12-31 NOTE — PLAN OF CARE
Problem: Potential for Falls  Goal: Patient will remain free of falls  Description: INTERVENTIONS:  - Educate patient/family on patient safety including physical limitations  - Instruct patient to call for assistance with activity   - Consult OT/PT to assist with strengthening/mobility   - Keep Call bell within reach  - Keep bed low and locked with side rails adjusted as appropriate  - Keep care items and personal belongings within reach  - Initiate and maintain comfort rounds  - Make Fall Risk Sign visible to staff  - Offer Toileting every 2 Hours, in advance of need  - Apply yellow socks and bracelet for high fall risk patients  - Consider moving patient to room near nurses station  Outcome: Progressing     Problem: Neurological Deficit  Goal: Neurological status is stable or improving  Description: Interventions:  - Monitor and assess patient's level of consciousness, motor function, sensory function, and level of assistance needed for ADLs.   - Monitor and report changes from baseline. Collaborate with interdisciplinary team to initiate plan and implement interventions as ordered.   - Provide and maintain a safe environment.  - Consider seizure precautions.  - Consider fall precautions.  - Consider aspiration precautions.  - Consider bleeding precautions.  Outcome: Progressing     Problem: Activity Intolerance/Impaired Mobility  Goal: Mobility/activity is maintained at optimum level for patient  Description: Interventions:  - Assess and monitor patient  barriers to mobility and need for assistive/adaptive devices.  - Assess patient's emotional response to limitations.  - Collaborate with interdisciplinary team and initiate plans and interventions as ordered.  - Encourage independent activity per ability.  - Maintain proper body alignment.  - Perform active/passive rom as tolerated/ordered.  - Plan activities to conserve energy.  - Turn patient as appropriate  Outcome: Progressing     Problem: Communication  Impairment  Goal: Ability to express needs and understand communication  Description: Assess patient's communication skills and ability to understand information.  Patient will demonstrate use of effective communication techniques, alternative methods of communication and understanding even if not able to speak.     - Encourage communication and provide alternate methods of communication as needed.  - Collaborate with case management/ for discharge needs.  - Include patient/family/caregiver in decisions related to communication.  Outcome: Progressing     Problem: Potential for Aspiration  Goal: Non-ventilated patient's risk of aspiration is minimized  Description: Assess and monitor vital signs, respiratory status, and labs (WBC).  Monitor for signs of aspiration (tachypnea, cough, rales, wheezing, cyanosis, fever).    - Assess and monitor patient's ability to swallow.  - Place patient up in chair to eat if possible.  - HOB up at 90 degrees to eat if unable to get patient up into chair.  - Supervise patient during oral intake.   - Instruct patient/ family to take small bites.  - Instruct patient/ family to take small single sips when taking liquids.  - Follow patient-specific strategies generated by speech pathologist.  Outcome: Progressing

## 2023-12-31 NOTE — CASE MANAGEMENT
Case Management Discharge Planning Note    Patient name Chantelle Burgos  Location S /S -01 MRN [id number]  : 1935 Date 2023       Current Admission Date: 2023  Current Admission Diagnosis:Stroke (cerebrum) (HCC)   Patient Active Problem List    Diagnosis Date Noted    Stroke (cerebrum) (HCC) 2023    Multifocal strokes 2023    Atrial fibrillation (HCC) 2023    Aortic valve disorder 2020    Essential hypertension 2020    Hyperlipidemia 2020    Pulmonary hypertension (HCC) 2020    Ventricular premature depolarization 2020      LOS (days): 2  Geometric Mean LOS (GMLOS) (days):   Days to GMLOS:     OBJECTIVE:  Risk of Unplanned Readmission Score: 9.74         Current admission status: Inpatient   Preferred Pharmacy:   [facility]/pharmacy #[id number] - [place], [place] - [address]  [address] [address]  Phone: [phone] Fax: [phone]    Primary Care Provider: Buddy Carmen MD    Primary Insurance: MEDICARE  Secondary Insurance: [facility]    DISCHARGE DETAILS:  CM contacted patient's pharmacy at . Spoke with pharmacist. Xarelto copay is $44.00. Pharmacy has available for patient today.     SLIM updated.

## 2023-12-31 NOTE — ASSESSMENT & PLAN NOTE
Transferred from [facility] on 12/29. Patient reports her family noticed difficulty speaking on the morning of 12/28, the episode lasted 2 minutes and went away.   NIHHS on assessment is 0, GCS 15, alert and oriented x4 pleasant female.   On 12/29 she visited her PCP who instructed the patient and family to seek further help. The patient was taken to [facility].   At [facility] an MRI of the brain was taken showing small subacute infarcts in left parietal lobe and left periventricular occipital region. Recommend follow-up MRI brain with contrast to exclude underlying lesions and mild chronic microangiopathy. An MRA of the brain resulted in a normal MRA Brain with variant anatomy.  Patient was transferred to [place] to obtain an MRI w contrast of the brain and continue with stroke pathway.  Continue statin  Discontinue aspirin and start on Xarelto   Neurology follow-up appreciated

## 2023-12-31 NOTE — ASSESSMENT & PLAN NOTE
With A-fib RVR, Toprol-XL increased to 50 mg twice daily  Cardiology eval appreciated  Recommend anticoagulation, [id number] is 6.  Will start on p.o. Xarelto.

## 2023-12-31 NOTE — ASSESSMENT & PLAN NOTE
Stable per patient reports, follows closely with cardiology she states  Increase Toprol-XL to 50 mg twice daily

## 2023-12-31 NOTE — DISCHARGE SUMMARY
[facility]  Discharge- Chantelle Burgos 1935, 88 y.o. female MRN: [id number]  Unit/Bed#: S -Evi Encounter: [id number]  Primary Care Provider: Buddy Carmen MD   Date and time admitted to hospital: 12/29/2023 10:38 PM    * Stroke (cerebrum) (HCC)  Assessment & Plan  Transferred from [facility] on 12/29. Patient reports her family noticed difficulty speaking on the morning of 12/28, the episode lasted 2 minutes and went away.   NIHHS on assessment is 0, GCS 15, alert and oriented x4 pleasant female.   On 12/29 she visited her PCP who instructed the patient and family to seek further help. The patient was taken to [facility].   At [facility] an MRI of the brain was taken showing small subacute infarcts in left parietal lobe and left periventricular occipital region. Recommend follow-up MRI brain with contrast to exclude underlying lesions and mild chronic microangiopathy. An MRA of the brain resulted in a normal MRA Brain with variant anatomy.  Patient was transferred to [place] to obtain an MRI w contrast of the brain and continue with stroke pathway.  Continue statin  Discontinue aspirin and start on Xarelto   Neurology follow-up appreciated    Atrial fibrillation (HCC)  Assessment & Plan  With A-fib RVR, Toprol-XL increased to 50 mg twice daily  Cardiology eval appreciated  Recommend anticoagulation, [id number] is 6.  Will start on p.o. Xarelto.    Pulmonary hypertension (HCC)  Assessment & Plan  Stable per patient report  Follows up with cardiology as outpatient    Hyperlipidemia  Assessment & Plan  Takes prevastatin 80 mg daily at dinner, continue with home regime  Lipid panel ordered  Stable per patient report    Essential hypertension  Assessment & Plan  Stable per patient reports, follows closely with cardiology she states  Increase Toprol-XL to 50 mg twice daily    Aortic valve disorder  Assessment & Plan  Follows with cardiology outpatient      Medical Problems        "Resolved Problems  Date Reviewed: 12/31/2023   None       Discharging Physician / Practitioner: Edgar Dubois PA-C  PCP: Buddy Carmen MD  Admission Date:   Admission Orders (From admission, onward)       Ordered        12/29/23 2301  Inpatient Admission  Once                          Discharge Date: 12/31/23    Consultations During Hospital Stay:  Cardiology   Neurology    Procedures Performed:   none    Significant Findings / Test Results:   MRI brain as above    Incidental Findings:   none     Test Results Pending at Discharge (will require follow up):   none     Outpatient Tests Requested:  none    Complications:  none    Reason for Admission: CVA    Hospital Course:   Chantelle Burgos is a 88 y.o. female patient who originally presented to the hospital on 12/29/2023 due to dysarthria.  Presented to [facility], and MRI brain was done which showed small subacute infarct in left parietal lobe and left periventricular occipital region.  Patient was transferred for MRI brain which showed normal findings with variant anatomy.  While hospitalized was found to be in atrial fibrillation with RVR, Toprol-XL was uptitrated to 50 mg twice daily.  Decision was made to start the patient on anticoagulation, Xarelto was started due to cost.  Patient will be discharged in stable condition with outpatient cardiology follow-up and neurology follow-up.  Aspirin was discontinued.    Please see above list of diagnoses and related plan for additional information.     Condition at Discharge: stable    Discharge Day Visit / Exam:   Subjective: Went into A-fib RVR this morning, improved with Lopressor.  Wants to be discharged today, no focal deficits.  No chest pain or palpitations.  Vitals: Blood Pressure: 126/54 (12/31/23 1217)  Pulse: 99 (12/31/23 1217)  Temperature: (!) 97.3 °F (36.3 °C) (12/31/23 0745)  Temp Source: Oral (12/29/23 2300)  Respirations: 16 (12/30/23 2309)  Height: 4' 11\" (149.9 cm) (12/30/23 0927)  Weight - " Scale: 61.1 kg (134 lb 11.2 oz) (12/31/23 0600)  SpO2: 95 % (12/31/23 1217)  Exam:   Physical Exam  Vitals and nursing note reviewed.   Constitutional:       General: She is not in acute distress.     Appearance: Normal appearance.   HENT:      Head: Normocephalic.      Mouth/Throat:      Mouth: Mucous membranes are moist.   Eyes:      General: No scleral icterus.     Pupils: Pupils are equal, round, and reactive to light.   Cardiovascular:      Rate and Rhythm: Normal rate. Rhythm irregularly irregular.      Heart sounds: No murmur heard.  Pulmonary:      Effort: Pulmonary effort is normal. No respiratory distress.      Breath sounds: Normal breath sounds. No wheezing, rhonchi or rales.   Abdominal:      General: Bowel sounds are normal. There is no distension.      Palpations: Abdomen is soft.      Tenderness: There is no abdominal tenderness.   Musculoskeletal:         General: No swelling.      Right lower leg: No edema.      Left lower leg: No edema.   Skin:     Capillary Refill: Capillary refill takes less than 2 seconds.   Neurological:      General: No focal deficit present.      Mental Status: She is alert and oriented to person, place, and time. Mental status is at baseline.          Discussion with Family: Updated  (daughter and son in law) via phone.    Discharge instructions/Information to patient and family:   See after visit summary for information provided to patient and family.      Provisions for Follow-Up Care:  See after visit summary for information related to follow-up care and any pertinent home health orders.       Disposition:   Home    Planned Readmission: no     Discharge Statement:  I spent 45 minutes discharging the patient. This time was spent on the day of discharge. I had direct contact with the patient on the day of discharge. Greater than 50% of the total time was spent examining patient, answering all patient questions, arranging and discussing plan of care with  patient as well as directly providing post-discharge instructions.  Additional time then spent on discharge activities.    Discharge Medications:  See after visit summary for reconciled discharge medications provided to patient and/or family.      **Please Note: This note may have been constructed using a voice recognition system**

## 2024-01-29 ENCOUNTER — TELEPHONE (OUTPATIENT)
Dept: NEUROLOGY | Facility: CLINIC | Age: 89
End: 2024-01-29

## 2024-01-29 NOTE — TELEPHONE ENCOUNTER
Called patient and Asked for only [facility] office! So I then offered all of the appointments left with  in [place] but she will be away needs an appt in march or later. She then stated if no appts in [place] she will go elsewhere. I then offered first available appt with , [facility], 5/13/2024, 11 am and placed on the waiting list for March and mailed appt card.     Thank you,     Haley HENDERSON/ TERRENCE DASH/ Multifocal strokes     DC- 12/31/2024-  HOME     Follow-up with neurovascular attending/AP/resident clinic in 4 to 6 weeks after discharge.  Pending for discharge: per primary team

## 2024-05-09 ENCOUNTER — TELEPHONE (OUTPATIENT)
Dept: NEUROLOGY | Facility: CLINIC | Age: 89
End: 2024-05-09

## 2024-05-09 NOTE — TELEPHONE ENCOUNTER
Spoke with patient  and wished to cancel appt due to dental appt. Patient will call back and reschedule appt.

## 2024-07-05 ENCOUNTER — TELEPHONE (OUTPATIENT)
Age: 89
End: 2024-07-05

## 2024-07-05 NOTE — TELEPHONE ENCOUNTER
Patient has an f/u appointment in 2 weeks but she never went for PT. Should she go for that first and then come in or should she keep the appointment here for 7/19?    CB: [phone]

## 2024-07-05 NOTE — TELEPHONE ENCOUNTER
Called pt and spoke to her to say appt on the 19th had been ok'd to keep and pt agreeable to that.

## 2024-07-19 ENCOUNTER — OFFICE VISIT (OUTPATIENT)
Dept: UROLOGY | Facility: CLINIC | Age: 89
End: 2024-07-19
Payer: MEDICARE

## 2024-07-19 VITALS
WEIGHT: 138 LBS | DIASTOLIC BLOOD PRESSURE: 66 MMHG | HEIGHT: 59 IN | OXYGEN SATURATION: 94 % | SYSTOLIC BLOOD PRESSURE: 130 MMHG | BODY MASS INDEX: 27.82 KG/M2 | HEART RATE: 60 BPM

## 2024-07-19 DIAGNOSIS — R39.15 URGENCY OF URINATION: Primary | ICD-10-CM

## 2024-07-19 PROCEDURE — 99213 OFFICE O/P EST LOW 20 MIN: CPT

## 2024-07-19 RX ORDER — METOPROLOL TARTRATE 50 MG/1
50 TABLET, FILM COATED ORAL 2 TIMES DAILY
COMMUNITY
Start: 2024-05-07

## 2024-07-19 RX ORDER — ROSUVASTATIN CALCIUM 20 MG/1
TABLET, COATED ORAL
COMMUNITY
Start: 2024-06-23

## 2024-07-19 RX ORDER — DULOXETIN HYDROCHLORIDE 30 MG/1
30 CAPSULE, DELAYED RELEASE ORAL DAILY
COMMUNITY
Start: 2024-07-07

## 2024-07-19 RX ORDER — SIMVASTATIN 40 MG
1 TABLET ORAL EVERY EVENING
COMMUNITY

## 2024-07-19 NOTE — PROGRESS NOTES
7/19/2024      No chief complaint on file.        Assessment and Plan    88 y.o. female managed by     Mixed stress and urge urinary incontinence  -Urgency, urge incontinence, nocturia x 1  -Proper hydration and bowel management; patient is having daily bowel movements  -Referral to pelvic floor physical therapy; patient did not try this yet but she expressed interest that she would like to try PT now.  -We discussed initiating a trial of a beta 3 agonist, discussed side effect profile. Patient does not wish to trial medication right now.   -We will return to clinic in 6 months for follow-up.        History of Present Illness  Chantelle Burgos is a 88 y.o. female here with history of urgency and mixed stress and urge urinary incontinence. She wears 1-2 pads per day. She states that her leakage is mostly with urgency. She denies dysuria, frequency, and gross hematuria. She does report that she is having regular bowel movements and maintaining proper hydration. She has not gotten a chance to try pelvic floor PT yet but she states that she will do this now.   She has not previously tried pharmacotherapy or surgical procedures for her urinary tract in the past. She denies history of recurrent UTIs.     Review of Systems   Constitutional:  Negative for chills and fever.   HENT:  Negative for ear pain and sore throat.    Eyes:  Negative for pain and visual disturbance.   Respiratory:  Negative for cough and shortness of breath.    Cardiovascular:  Negative for chest pain and palpitations.   Gastrointestinal:  Negative for abdominal pain and vomiting.   Genitourinary:  Positive for urgency. Negative for difficulty urinating, dysuria, flank pain, frequency and hematuria.        Urge incontinence, 1-2 pad per day  Nocturia x1   Musculoskeletal:  Negative for arthralgias and back pain.   Skin:  Negative for color change and rash.   Neurological:  Negative for seizures and syncope.   All other systems reviewed and are  negative.               Vitals  There were no vitals filed for this visit.    Physical Exam  Constitutional:       Appearance: Normal appearance.   HENT:      Head: Normocephalic.   Eyes:      Extraocular Movements: Extraocular movements intact.      Pupils: Pupils are equal, round, and reactive to light.   Pulmonary:      Effort: Pulmonary effort is normal. No respiratory distress.   Musculoskeletal:         General: Normal range of motion.      Cervical back: Normal range of motion.   Neurological:      Mental Status: She is alert and oriented to person, place, and time.   Psychiatric:         Mood and Affect: Mood normal.         Behavior: Behavior normal.         Thought Content: Thought content normal.         Judgment: Judgment normal.           Past History  Past Medical History:   Diagnosis Date    Hypertension      Social History     Socioeconomic History    Marital status:      Spouse name: Not on file    Number of children: Not on file    Years of education: Not on file    Highest education level: Not on file   Occupational History    Not on file   Tobacco Use    Smoking status: Never    Smokeless tobacco: Never   Vaping Use    Vaping status: Never Used   Substance and Sexual Activity    Alcohol use: Not Currently    Drug use: Never    Sexual activity: Not Currently   Other Topics Concern    Not on file   Social History Narrative    Not on file     Social Determinants of Health     Financial Resource Strain: Not on file   Food Insecurity: Not on file   Transportation Needs: Not on file   Physical Activity: Not on file   Stress: Not on file   Social Connections: Not on file   Intimate Partner Violence: Not on file   Housing Stability: Not on file     Social History     Tobacco Use   Smoking Status Never   Smokeless Tobacco Never     Family History   Problem Relation Age of Onset    Heart attack Mother        The following portions of the patient's history were reviewed and updated as appropriate:  "allergies, current medications, past medical history, past social history, past surgical history and problem list.    Results  No results found for this or any previous visit (from the past 1 hour(s)).]  No results found for: \"PSA\"  Lab Results   Component Value Date    CALCIUM 9.5 12/31/2023    K 4.0 12/31/2023    CO2 27 12/31/2023     12/31/2023    BUN 23 12/31/2023    CREATININE 0.94 12/31/2023     Lab Results   Component Value Date    WBC 6.37 12/31/2023    HGB 13.5 12/31/2023    HCT 40.9 12/31/2023    MCV 99 (H) 12/31/2023     12/31/2023       DARWIN Justice  "

## 2024-08-16 ENCOUNTER — EVALUATION (OUTPATIENT)
Dept: PHYSICAL THERAPY | Facility: REHABILITATION | Age: 89
End: 2024-08-16
Payer: MEDICARE

## 2024-08-16 DIAGNOSIS — N39.46 MIXED STRESS AND URGE URINARY INCONTINENCE: Primary | ICD-10-CM

## 2024-08-16 PROCEDURE — 97530 THERAPEUTIC ACTIVITIES: CPT | Performed by: PHYSICAL THERAPIST

## 2024-08-16 PROCEDURE — 97162 PT EVAL MOD COMPLEX 30 MIN: CPT | Performed by: PHYSICAL THERAPIST

## 2024-08-16 NOTE — PROGRESS NOTES
PT Evaluation     Today's date: 2024  Patient name: Chantelle Burgos  : 1935  MRN: [id number]  Referring provider: Vero Tay CRNP  Dx:   Encounter Diagnosis     ICD-10-CM    1. Mixed stress and urge urinary incontinence  N39.46 Ambulatory Referral to Physical Therapy          Start Time: 1000  Stop Time: 1100  Total time in clinic (min): 60 minutes    Assessment  Impairments: abnormal muscle tone, abnormal or restricted ROM, activity intolerance, impaired physical strength and lacks appropriate home exercise program    Assessment details: The patient is a 89 year old female with complaints of mixed urinary incontinence. She presents with evident tissue descent, pelvic floor muscle weakness, endurance deficits. She would benefit from pelvic floor physical therapy to help reduce/manage symptoms, address impairments and maximize function and quality of life upon discharge. Therapeutic activities performed upon examination included education regarding pelvic floor anatomy, explanation of exam technique, explanation of exam findings and discussion of treatment plan as well as expectations of the patient to emphasize the importance of compliance and adherence to physical therapy visits. She will be given updated HEP throughout episode of care. Patient was provided with bladder and bowel diary to be completed for next visit.     Pelvic floor verbal consent and written consent signed and in chart    Education provided today:   Pelvic floor anatomy and function  Physiology/relationship of abdominal canister and pelvis/pelvic organs/pelvic floor muscles  Diaphragm and Diaphragmatic breathing  Bowel and Bladder anatomy and function  Pelvic Organ Prolapse - explanation and discussion of management of symptoms  Importance of body mechanics and ergonomics in regards to protecting against activities which increase IAP and pressure  Hormonal changes during and after menopause  PT exam and course of  treatment  Bladder and Bowel diary             Goals  STG:  The patient will improve pelvic floor muscle strength 1 to 2 grade in 8 to 10 weeks.   The patient will improve pelvic floor muscle endurance to 5 seconds in 8 to 10 weeks.    LTG:   The patient will be independent with HEP upon discharge.   The patient will control urinary urgency and minimize urge incontinence upon discharge  The patient will reduce urinary leakage with coughing and sneezing and demonstrate good understanding of pressure management upon discharge.      Plan  Patient would benefit from: skilled physical therapy    Frequency: 1x week  Duration in weeks: 8  Plan of Care beginning date: 8/16/2024  Plan of Care expiration date: 10/11/2024  Treatment plan discussed with: patient        PT Pelvic Floor Subjective:   History of Present Illness:   Patient is an 89 y.o. presenting to physical therapy with complaints of urinary leakage beginning 2 years ago. She reports getting a sudden urge to urinate and can have urinary leakage prior to making it to the bathroom. She also reports leakage with coughing and sneezing. She wears a pad daily.      Social Support:     Lives with:  Alone    Relationship status:     Employment status: volunteer at [employer].  Diet and Exercise:      Steps in her house  Playing with kids and grandkids    Volunteers at [employer]'s short procedures   OB/ gyn History    Gestational History:     Prior Pregnancy: Yes      Number of prior pregnancies: 4    Number of term pregnancies: 4    Delivery Type: vaginal delivery      Number of vaginal deliveries: 4    Menstrual History:    no hormone replacement therapy  Bladder Function:     Voiding Difficulties positive for: urgency      Voiding Difficulties negative for: frequent urination (can have increased frequency with increased fluid intake; unsure how often she empties), straining and incomplete emptying       Voiding Difficulties comments:     Urinary frequency:  3x/day.    Urinary leakage: urine leakage    Urinary leakage aggravated by: coughing (lifting), sneezing and walking to the bathroom    Nocturia (episodes per night): 1 and 2    Fluid Intake Type:  Coffee and water    Intake (ounces):     Intake (ounces) comment: Water: 32 oz   Coffee: 1 cup caffeinated   Glass of milk occasionally  Incontinence Management:     Pads/Diaper Use:  DayLeakage can be a daily occurrence :  Bowel Function:     Bowel frequency: every 2 days and daily    Stool softener use: stool softeners (occasionally)  Patient Goals:     Patient goals for therapy:  Improved bladder or bowel function, improved comfort and improved quality of life    Other patient goals:  Help with urinary leakage      Objective       Abdominal Assessment:          General Perineum Exam:   perineum intact.   Positive for descent and no pelvic organ prolapse at rest.   Negative for swelling and perianal erythema    General perineum exam comments: External palpation:  Pubic symphysis (-)  Ischiocavernosus/Bulbospongiosus (-)   Ischial ramus (-)  Superficial transverse perineal (-)  Ischial tuberosity (-)      Visual Inspection of Perineum:   Excursion of perineal body in cephalad direction with contraction of pelvic floor muscles (PFM): weak  Excursion of perineal body in caudal direction with relaxation of pelvic floor muscles (PFM): fair   Sphincter Tone Resting: weak  Sensation: intact    Pelvic Organ Prolapse   At rest: none  With bearing down: mild (>1cm from hymenal remnants)  Location: anterior        Pelvic Floor Muscle Exam:     Muscle Contraction: well isolated    Pelvic floor muscle relaxation is complete.         PERFECT Score   Power right: 1+/5   Power left: 2/5   Endurance (seconds to max): 2         pelvic floor exam consent given by patient    Pelvic exam completed: vaginally                  Precautions: HTN, TIA in January 2024, Afib, cystocele repair 35 years ago  Biofeedback Codes: covered  Goals:  decreased urgency, urge incontinence, HANK with coughing and sneezing       IE RE   LIBIA-18     PFDI-20     V-Q     PGQ         Manuals 8/16                                      Neuro Re-Ed             SEMG Biofeedback             Diaphragmatic breathing nv            TA ADIM nv            PFMC Slow Holds nv            PFMC Quick Flicks nv            Seated PFMC             Seated PFMC with pelvic tilt             Standing PFMC             Ther Ex             PFMC + hip add iso nv            PFMC + hip abd iso nv            PFMC + bridge nv            Clamshells                          Ther Activity             Bladder/Bowel Diary Review and Counseling nv            Education Done            Urge Suppression **            PFMC + sit to stand             PFMC + reaching             PFMC + lift             PFMC + cough             PFMC + step ups

## 2024-08-19 DIAGNOSIS — R32 URINARY INCONTINENCE, UNSPECIFIED TYPE: Primary | ICD-10-CM

## 2024-08-23 ENCOUNTER — OFFICE VISIT (OUTPATIENT)
Dept: PHYSICAL THERAPY | Facility: REHABILITATION | Age: 89
End: 2024-08-23
Payer: MEDICARE

## 2024-08-23 DIAGNOSIS — N39.46 MIXED STRESS AND URGE URINARY INCONTINENCE: Primary | ICD-10-CM

## 2024-08-23 PROCEDURE — 97110 THERAPEUTIC EXERCISES: CPT

## 2024-08-23 PROCEDURE — 97112 NEUROMUSCULAR REEDUCATION: CPT

## 2024-08-23 PROCEDURE — 97530 THERAPEUTIC ACTIVITIES: CPT

## 2024-08-23 NOTE — PROGRESS NOTES
Daily Note     Today's date: 2024  Patient name: Chantelle Burgos  : 1935  MRN: [id number]  Referring provider: Ariana Schaefer,*  Dx:   Encounter Diagnosis     ICD-10-CM    1. Mixed stress and urge urinary incontinence  N39.46           Start Time: 1000  Stop Time: 1045  Total time in clinic (min): 45 minutes    Subjective: Patient states she had leakage over the last week, especially with steps. Patient reports no complaints after previous session. She states she did not fill out bladder diary, but will bring to next session.      Objective: See treatment diary below      Assessment: Tolerated treatment well. Patient demonstrated fatigue post treatment, exhibited good technique with therapeutic exercises, and would benefit from continued PT Initiated POC this session with good tolerance. Patient demonstrates overall good coordination of proper breathing and TA/PFMC. Patient reports most challenge with endurance of PFMC. Cues required for proper relaxation time after each rep, able to self correct. No leakage noted throughout session. Patient educated on HEP, reporting understanding.       Plan: Continue per plan of care.  Progress treatment as tolerated.       Precautions: HTN, TIA in 2024, Afib, cystocele repair 35 years ago  Biofeedback Codes: covered  Goals: decreased urgency, urge incontinence, HANK with coughing and sneezing       IE RE   LIBIA-18     PFDI-20     V-Q     PGQ         Manuals                                      Neuro Re-Ed             SEMG Biofeedback             Diaphragmatic breathing nv Done            TA ADIM nv 10x           PFMC Slow Holds nv 10x           PFMC Quick Flicks nv 10x           Seated PFMC             Seated PFMC with pelvic tilt             Standing PFMC             Ther Ex             PFMC + hip add iso nv 10x           PFMC + hip abd iso nv Pink TB 10x           PFMC + bridge nv            Clamshells                          Ther Activity              Bladder/Bowel Diary Review and Counseling nv nv           Education Done Done            Urge Suppression **            PFMC + sit to stand             PFMC + reaching             PFMC + lift             PFMC + cough             PFMC + step ups

## 2024-08-30 ENCOUNTER — OFFICE VISIT (OUTPATIENT)
Dept: PHYSICAL THERAPY | Facility: REHABILITATION | Age: 89
End: 2024-08-30
Payer: MEDICARE

## 2024-08-30 DIAGNOSIS — N39.46 MIXED STRESS AND URGE URINARY INCONTINENCE: Primary | ICD-10-CM

## 2024-08-30 PROCEDURE — 97530 THERAPEUTIC ACTIVITIES: CPT

## 2024-08-30 PROCEDURE — 97112 NEUROMUSCULAR REEDUCATION: CPT

## 2024-08-30 PROCEDURE — 97110 THERAPEUTIC EXERCISES: CPT

## 2024-08-30 NOTE — PROGRESS NOTES
"Daily Note     Today's date: 2024  Patient name: Chantelle Burgos  : 1935  MRN: [id number]  Referring provider: Ariana Schaefer,*  Dx:   Encounter Diagnosis     ICD-10-CM    1. Mixed stress and urge urinary incontinence  N39.46           Start Time: 1000  Stop Time: 1050  Total time in clinic (min): 50 minutes    Subjective: Patient reports some muscle soreness after last session stating \"it was a good sore, it didn't last long.\" She reports no leakage over the last week. Patient states she did complete her HEP, however did not complete bladder diary.       Objective: See treatment diary below      Assessment: Tolerated treatment well. Patient demonstrated fatigue post treatment, exhibited good technique with therapeutic exercises, and would benefit from continued PT Good tolerance to all TE today and addition of bridges and seated PFMC. Continues to demonstrate good coordination with fatigue noted at end of session. Patient educated on urge suppression technique freeze breath squeeze with patient reporting understanding.       Plan: Continue per plan of care.  Progress treatment as tolerated.       Precautions: HTN, TIA in 2024, Afib, cystocele repair 35 years ago  Biofeedback Codes: covered  Goals: decreased urgency, urge incontinence, HANK with coughing and sneezing       IE RE   LIBIA-18     PFDI-20     V-Q     PGQ         Manuals                                     Neuro Re-Ed             SEMG Biofeedback             Diaphragmatic breathing nv Done  Done           TA ADIM nv 10x 10x          PFMC Slow Holds nv 10x 10x          PFMC Quick Flicks nv 10x 10x          Seated PFMC   10x          Seated PFMC with pelvic tilt             Standing PFMC             Ther Ex             PFMC + hip add iso nv 10x 10x          PFMC + hip abd iso nv Pink TB 10x Pink TB 10x          PFMC + bridge nv  10x          Clamshells                          Ther Activity             Bladder/Bowel " Diary Review and Counseling nv nv nv          Education Done Done  Done           Urge Suppression **  Done freeze breathe squeeze          PFMC + sit to stand             PFMC + reaching             PFMC + lift             PFMC + cough             PFMC + step ups

## 2024-09-06 ENCOUNTER — APPOINTMENT (OUTPATIENT)
Dept: PHYSICAL THERAPY | Facility: REHABILITATION | Age: 89
End: 2024-09-06
Payer: MEDICARE

## 2024-09-13 ENCOUNTER — OFFICE VISIT (OUTPATIENT)
Dept: PHYSICAL THERAPY | Facility: REHABILITATION | Age: 89
End: 2024-09-13
Payer: MEDICARE

## 2024-09-13 DIAGNOSIS — N39.46 MIXED STRESS AND URGE URINARY INCONTINENCE: Primary | ICD-10-CM

## 2024-09-13 PROCEDURE — 97112 NEUROMUSCULAR REEDUCATION: CPT | Performed by: PHYSICAL THERAPIST

## 2024-09-13 PROCEDURE — 97110 THERAPEUTIC EXERCISES: CPT | Performed by: PHYSICAL THERAPIST

## 2024-09-13 NOTE — PROGRESS NOTES
Daily Note     Today's date: 2024  Patient name: Chantelle Burgos  : 1935  MRN: [id number]  Referring provider: Ariana Schaefer,*  Dx:   Encounter Diagnosis     ICD-10-CM    1. Mixed stress and urge urinary incontinence  N39.46           Start Time: 952  Stop Time:   Total time in clinic (min): 47 minutes    Subjective: Patient reports urinary urgency is not worse and has noticed some small improvement. She is using size 2 pads, sometimes size if she will be out of the house, and has days where she will only use one pad.       Objective: See treatment diary below      Assessment: Tolerated treatment well. Patient demonstrated fatigue post treatment, exhibited good technique with therapeutic exercises, and would benefit from continued PT. Progressed PFM strengthening this visit in sitting and standing positions against gravity. Patient has difficulty with coordination of breathing therefore instructed normal breathing with kegels. Initiated additional hip strengthening this visit as well with good tolerance. Evident muscle fatigue noted post-treatment. Provided patient with updated HEP including kegels sitting and standing at home and PFM + hip isometrics 2-3x/week.      Plan: Continue per plan of care.      Precautions: HTN, TIA in 2024, Afib, cystocele repair 35 years ago  Biofeedback Codes: covered  Goals: decreased urgency, urge incontinence, HANK with coughing and sneezing       IE RE   LIBIA-18     PFDI-20     V-Q     PGQ         Manuals                                    Neuro Re-Ed             SEMG Biofeedback             Diaphragmatic breathing nv Done  Done           TA ADIM nv 10x 10x +PFMC 10x dc         PFMC Slow Holds nv 10x 10x dc         PFMC Quick Flicks nv 10x 10x dc         Seated PFMC   10x SH 10x  QF 10x         Standing PFMC    SH 10x  QF 10x         Ther Ex             PFMC + hip add iso nv 10x 10x Seated 10x         PFMC + hip abd iso nv Pink TB 10x  Pink TB 10x Seated Pink 10x         PFMC + bridge nv  10x 10x         Clamshells    [place] 2x10 b/l         Standing hip abd    nv         Standing hip ext    nv         TB side stepping    nv                      Ther Activity             Bladder/Bowel Diary Review and Counseling nv nv nv          Education Done Done  Done  Done          Urge Suppression **  Done freeze breathe squeeze reviewed         PFMC + sit to stand             PFMC + reaching    nv         PFMC + lift    nv         PFMC + cough    nv         PFMC + step ups

## 2024-09-20 ENCOUNTER — OFFICE VISIT (OUTPATIENT)
Dept: PHYSICAL THERAPY | Facility: REHABILITATION | Age: 89
End: 2024-09-20
Payer: MEDICARE

## 2024-09-20 DIAGNOSIS — N39.46 MIXED STRESS AND URGE URINARY INCONTINENCE: Primary | ICD-10-CM

## 2024-09-20 PROCEDURE — 97110 THERAPEUTIC EXERCISES: CPT | Performed by: PHYSICAL THERAPIST

## 2024-09-20 PROCEDURE — 97530 THERAPEUTIC ACTIVITIES: CPT | Performed by: PHYSICAL THERAPIST

## 2024-09-20 NOTE — PROGRESS NOTES
Daily Note     Today's date: 2024  Patient name: Chantelle Burgos  : 1935  MRN: [id number]  Referring provider: Ariana Schaefer,*  Dx:   Encounter Diagnosis     ICD-10-CM    1. Mixed stress and urge urinary incontinence  N39.46           Start Time: 1000  Stop Time: 1038  Total time in clinic (min): 38 minutes    Subjective: Patient reports muscle soreness in the abdominal muscles for a couple days after last session. Patient reports urinary leakage was about the same over the last week with urgency and coughing/sneezing.         Objective: See treatment diary below      Assessment: Tolerated treatment well. Patient demonstrated fatigue post treatment, exhibited good technique with therapeutic exercises, and would benefit from continued PT. Initiated additional hip strengthening this visit with good tolerance, evident muscle fatigue noted. Also practiced functional movements such as squatting and lifting with pelvic floor muscle engagement for pressure management. Educated patient on pressure management with coughing/sneezing and discussed continuing to try urge suppression techniques to avoid rushing to the bathroom.      Plan: Continue per plan of care.      Precautions: HTN, TIA in 2024, Afib, cystocele repair 35 years ago  Biofeedback Codes: covered  Goals: decreased urgency, urge incontinence, HANK with coughing and sneezing       IE RE   LIBIA-18     PFDI-20     V-Q     PGQ         Manuals                                   Neuro Re-Ed             SEMG Biofeedback             Diaphragmatic breathing nv Done  Done           TA ADIM nv 10x 10x +PFMC 10x dc         PFMC Slow Holds nv 10x 10x dc         PFMC Quick Flicks nv 10x 10x dc         Seated PFMC   10x SH 10x  QF 10x         Standing PFMC    SH 10x  QF 10x         Ther Ex             PFMC + hip add iso nv 10x 10x Seated 10x         PFMC + hip abd iso nv Pink TB 10x Pink TB 10x Seated Pink 10x         PFMC + bridge  "nv  10x 10x 10x        Clamshells    Pink 2x10 b/l Pink 2x10 b/l        Standing hip abd    nv [place] 2x10 b/l        Standing hip ext    nv 2x10 b/l bw        TB side stepping    nv Pink 3 laps counter                     Ther Activity             Bladder/Bowel Diary Review and Counseling nv nv nv          Education Done Done  Done  Done  Done        Urge Suppression **  Done freeze breathe squeeze reviewed reviewed        PFMC + sit to stand     10x        PFMC + reaching    nv 8# 5x cabinet        PFMC + lift    nv 8# from 8\" step 5x        PFMC + cough    nv reviewed        PFMC + step ups                                         "

## 2024-09-27 ENCOUNTER — OFFICE VISIT (OUTPATIENT)
Dept: PHYSICAL THERAPY | Facility: REHABILITATION | Age: 89
End: 2024-09-27
Payer: MEDICARE

## 2024-09-27 DIAGNOSIS — N39.46 MIXED STRESS AND URGE URINARY INCONTINENCE: Primary | ICD-10-CM

## 2024-09-27 PROCEDURE — 97110 THERAPEUTIC EXERCISES: CPT | Performed by: PHYSICAL THERAPIST

## 2024-09-27 PROCEDURE — 97530 THERAPEUTIC ACTIVITIES: CPT | Performed by: PHYSICAL THERAPIST

## 2024-09-27 NOTE — PROGRESS NOTES
Daily Note     Today's date: 2024  Patient name: Chantelle Burgos  : 1935  MRN: [id number]  Referring provider: Ariana Schaefer,*  Dx:   Encounter Diagnosis     ICD-10-CM    1. Mixed stress and urge urinary incontinence  N39.46           Start Time: 1008  Stop Time: 1047  Total time in clinic (min): 39 minutes    Subjective: Patient reports no changes since last visit.      Objective: See treatment diary below      Assessment: Tolerated treatment well. Patient demonstrated fatigue post treatment, exhibited good technique with therapeutic exercises, and would benefit from continued PT.      Plan: Continue per plan of care.  Plan to follow up with patient in 2 weeks with compliance to HEP and potential d/c next visit.     Precautions: HTN, TIA in 2024, Afib, cystocele repair 35 years ago  Biofeedback Codes: covered  Goals: decreased urgency, urge incontinence, HANK with coughing and sneezing       IE RE   LIBIA-18     PFDI-20     V-Q     PGQ         Manuals                                  Neuro Re-Ed             SEMG Biofeedback             Diaphragmatic breathing nv Done  Done           TA ADIM nv 10x 10x +PFMC 10x dc         PFMC Slow Holds nv 10x 10x dc         PFMC Quick Flicks nv 10x 10x dc         Seated PFMC   10x SH 10x  QF 10x         Standing PFMC    SH 10x  QF 10x         Ther Ex             PFMC + hip add iso nv 10x 10x Seated 10x  Seated 2x10       PFMC + hip abd iso nv Pink TB 10x Pink TB 10x Seated Pink 10x  Seated Pink 2x10       PFMC + bridge nv  10x 10x 10x        Clamshells    Pink 2x10 b/l Pink 2x10 b/l Pink 2x10 b/l       Standing hip abd    nv Pink 2x10 b/l Pink 2x10 b/l       Standing hip ext    nv 2x10 b/l bw 2x10 b/l bw       TB side stepping    nv Pink 3 laps counter Pink 3 laps counter                    Ther Activity             Bladder/Bowel Diary Review and Counseling nv nv nv          Education Done Done  Done  Done  Done        Urge  "Suppression **  Done freeze breathe squeeze reviewed reviewed reviewed       PFMC + sit to stand     10x 10x       PFMC + reaching    nv 8# 5x cabinet        PFMC + lift    nv 8# from 8\" step 5x        PFMC + cough    nv reviewed        PFMC + step ups                                           "

## 2024-10-11 RX ORDER — DULOXETIN HYDROCHLORIDE 60 MG/1
60 CAPSULE, DELAYED RELEASE ORAL DAILY
COMMUNITY
Start: 2024-09-09

## 2024-10-11 NOTE — PRE-PROCEDURE INSTRUCTIONS
Pre-Surgery Instructions:   Medication Instructions    ascorbic acid (VITAMIN C) 500 MG tablet Hold day of surgery.    Cholecalciferol 50 MCG (2000 UT) CAPS Hold day of surgery.    DULoxetine (CYMBALTA) 60 mg delayed release capsule Take day of surgery.    metoprolol tartrate (LOPRESSOR) 50 mg tablet Take day of surgery.    rivaroxaban (Xarelto) 20 mg tablet Take day of surgery.    rosuvastatin (CRESTOR) 20 MG tablet Take day of surgery.    [DISCONTINUED] DULoxetine (CYMBALTA) 30 mg delayed release capsule     [DISCONTINUED] pravastatin (PRAVACHOL) 80 mg tablet     Medication instructions for day surgery reviewed. Please use only a sip of water to take your instructed medications. Avoid all over the counter vitamins, supplements and NSAIDS for one week prior to surgery per anesthesia guidelines. Tylenol is ok to take as needed.     You will receive a call one business day prior to surgery with an arrival time and hospital directions. If your surgery is scheduled on a Monday, the hospital will be calling you on the Friday prior to your surgery. If you have not heard from anyone by 8pm, please call the hospital supervisor through the hospital  at [phone]. ([place] [phone] or [place] [phone]).    Do not eat or drink anything after midnight the night before your surgery, including candy, mints, lifesavers, or chewing gum. Do not drink alcohol 24hrs before your surgery. Try not to smoke at least 24hrs before your surgery.       Follow the pre surgery showering instructions as listed in the “My Surgical Experience Booklet” or otherwise provided by your surgeon's office. Do not use a blade to shave the surgical area 1 week before surgery. It is okay to use a clean electric clippers up to 24 hours before surgery. Do not apply any lotions, creams, including makeup, cologne, deodorant, or perfumes after showering on the day of your surgery. Do not use dry shampoo, hair spray, hair gel, or any type of  hair products.     No contact lenses, eye make-up, or artificial eyelashes. Remove nail polish, including gel polish, and any artificial, gel, or acrylic nails if possible. Remove all jewelry including rings and body piercing jewelry.     Wear causal clothing that is easy to take on and off. Consider your type of surgery.    Keep any valuables, jewelry, piercings at home. Please bring any specially ordered equipment (sling, braces) if indicated.    Arrange for a responsible person to drive you to and from the hospital on the day of your surgery. Please confirm the visitor policy for the day of your procedure when you receive your phone call with an arrival time.     Call the surgeon's office with any new illnesses, exposures, or additional questions prior to surgery.    Please reference your “My Surgical Experience Booklet” for additional information to prepare for your upcoming surgery.

## 2024-10-14 ENCOUNTER — HOSPITAL ENCOUNTER (OUTPATIENT)
Facility: AMBULARY SURGERY CENTER | Age: 89
Setting detail: OUTPATIENT SURGERY
Discharge: HOME | End: 2024-10-14
Attending: OPHTHALMOLOGY | Admitting: OPHTHALMOLOGY
Payer: MEDICARE

## 2024-10-14 ENCOUNTER — ANESTHESIA EVENT (OUTPATIENT)
Dept: PERIOP | Facility: AMBULARY SURGERY CENTER | Age: 89
End: 2024-10-14
Payer: MEDICARE

## 2024-10-14 ENCOUNTER — ANESTHESIA (OUTPATIENT)
Dept: PERIOP | Facility: AMBULARY SURGERY CENTER | Age: 89
End: 2024-10-14
Payer: MEDICARE

## 2024-10-14 VITALS
RESPIRATION RATE: 16 BRPM | SYSTOLIC BLOOD PRESSURE: 110 MMHG | HEIGHT: 59 IN | OXYGEN SATURATION: 94 % | BODY MASS INDEX: 27.82 KG/M2 | WEIGHT: 138 LBS | HEART RATE: 49 BPM | TEMPERATURE: 97.3 F | DIASTOLIC BLOOD PRESSURE: 53 MMHG

## 2024-10-14 DIAGNOSIS — H25.812 COMBINED FORMS OF AGE-RELATED CATARACT OF LEFT EYE: Primary | ICD-10-CM

## 2024-10-14 RX ORDER — TETRACAINE HYDROCHLORIDE 5 MG/ML
SOLUTION OPHTHALMIC AS NEEDED
Status: DISCONTINUED | OUTPATIENT
Start: 2024-10-14 | End: 2024-10-14 | Stop reason: HOSPADM

## 2024-10-14 RX ORDER — LIDOCAINE HYDROCHLORIDE 10 MG/ML
INJECTION, SOLUTION EPIDURAL; INFILTRATION; INTRACAUDAL; PERINEURAL AS NEEDED
Status: DISCONTINUED | OUTPATIENT
Start: 2024-10-14 | End: 2024-10-14 | Stop reason: HOSPADM

## 2024-10-14 RX ORDER — GATIFLOXACIN 5 MG/ML
1 SOLUTION/ DROPS OPHTHALMIC 2 TIMES DAILY
Start: 2024-10-14

## 2024-10-14 RX ORDER — TIMOLOL MALEATE 5 MG/ML
1 SOLUTION/ DROPS OPHTHALMIC 2 TIMES DAILY
Start: 2024-10-14

## 2024-10-14 RX ORDER — MIDAZOLAM HYDROCHLORIDE 2 MG/2ML
INJECTION, SOLUTION INTRAMUSCULAR; INTRAVENOUS AS NEEDED
Status: DISCONTINUED | OUTPATIENT
Start: 2024-10-14 | End: 2024-10-14

## 2024-10-14 RX ORDER — KETOROLAC TROMETHAMINE 5 MG/ML
1 SOLUTION OPHTHALMIC 4 TIMES DAILY
Start: 2024-10-14

## 2024-10-14 RX ORDER — CYCLOPENTOLATE HYDROCHLORIDE 10 MG/ML
1 SOLUTION/ DROPS OPHTHALMIC
Status: COMPLETED | OUTPATIENT
Start: 2024-10-14 | End: 2024-10-14

## 2024-10-14 RX ORDER — POVIDONE-IODINE 5 %
SOLUTION, NON-ORAL OPHTHALMIC (EYE) AS NEEDED
Status: DISCONTINUED | OUTPATIENT
Start: 2024-10-14 | End: 2024-10-14 | Stop reason: HOSPADM

## 2024-10-14 RX ORDER — PREDNISOLONE ACETATE 10 MG/ML
1 SUSPENSION/ DROPS OPHTHALMIC 4 TIMES DAILY
Start: 2024-10-14

## 2024-10-14 RX ORDER — LIDOCAINE HYDROCHLORIDE 20 MG/ML
1 JELLY TOPICAL
Status: COMPLETED | OUTPATIENT
Start: 2024-10-14 | End: 2024-10-14

## 2024-10-14 RX ORDER — GATIFLOXACIN 5 MG/ML
SOLUTION/ DROPS OPHTHALMIC AS NEEDED
Status: DISCONTINUED | OUTPATIENT
Start: 2024-10-14 | End: 2024-10-14 | Stop reason: HOSPADM

## 2024-10-14 RX ORDER — TETRACAINE HYDROCHLORIDE 5 MG/ML
1 SOLUTION OPHTHALMIC ONCE
Status: COMPLETED | OUTPATIENT
Start: 2024-10-14 | End: 2024-10-14

## 2024-10-14 RX ORDER — KETOROLAC TROMETHAMINE 5 MG/ML
1 SOLUTION OPHTHALMIC
Status: COMPLETED | OUTPATIENT
Start: 2024-10-14 | End: 2024-10-14

## 2024-10-14 RX ORDER — BALANCED SALT SOLUTION 6.4; .75; .48; .3; 3.9; 1.7 MG/ML; MG/ML; MG/ML; MG/ML; MG/ML; MG/ML
SOLUTION OPHTHALMIC AS NEEDED
Status: DISCONTINUED | OUTPATIENT
Start: 2024-10-14 | End: 2024-10-14 | Stop reason: HOSPADM

## 2024-10-14 RX ORDER — PHENYLEPHRINE HYDROCHLORIDE 25 MG/ML
1 SOLUTION/ DROPS OPHTHALMIC
Status: COMPLETED | OUTPATIENT
Start: 2024-10-14 | End: 2024-10-14

## 2024-10-14 RX ADMIN — KETOROLAC TROMETHAMINE 1 DROP: 5 SOLUTION OPHTHALMIC at 08:10

## 2024-10-14 RX ADMIN — CYCLOPENTOLATE HYDROCHLORIDE 1 DROP: 10 SOLUTION/ DROPS OPHTHALMIC at 08:40

## 2024-10-14 RX ADMIN — LIDOCAINE HYDROCHLORIDE 1 APPLICATION: 20 JELLY TOPICAL at 08:10

## 2024-10-14 RX ADMIN — PHENYLEPHRINE HYDROCHLORIDE 1 DROP: 25 SOLUTION/ DROPS OPHTHALMIC at 07:55

## 2024-10-14 RX ADMIN — PHENYLEPHRINE HYDROCHLORIDE 1 DROP: 25 SOLUTION/ DROPS OPHTHALMIC at 08:25

## 2024-10-14 RX ADMIN — TETRACAINE HYDROCHLORIDE 1 DROP: 5 SOLUTION OPHTHALMIC at 07:54

## 2024-10-14 RX ADMIN — PHENYLEPHRINE HYDROCHLORIDE 1 DROP: 25 SOLUTION/ DROPS OPHTHALMIC at 08:10

## 2024-10-14 RX ADMIN — LIDOCAINE HYDROCHLORIDE 1 APPLICATION: 20 JELLY TOPICAL at 07:55

## 2024-10-14 RX ADMIN — CYCLOPENTOLATE HYDROCHLORIDE 1 DROP: 10 SOLUTION/ DROPS OPHTHALMIC at 08:25

## 2024-10-14 RX ADMIN — PHENYLEPHRINE HYDROCHLORIDE 1 DROP: 25 SOLUTION/ DROPS OPHTHALMIC at 08:40

## 2024-10-14 RX ADMIN — KETOROLAC TROMETHAMINE 1 DROP: 5 SOLUTION OPHTHALMIC at 08:25

## 2024-10-14 RX ADMIN — CYCLOPENTOLATE HYDROCHLORIDE 1 DROP: 10 SOLUTION/ DROPS OPHTHALMIC at 07:55

## 2024-10-14 RX ADMIN — KETOROLAC TROMETHAMINE 1 DROP: 5 SOLUTION OPHTHALMIC at 07:55

## 2024-10-14 RX ADMIN — KETOROLAC TROMETHAMINE 1 DROP: 5 SOLUTION OPHTHALMIC at 08:40

## 2024-10-14 RX ADMIN — MIDAZOLAM 2 MG: 1 INJECTION INTRAMUSCULAR; INTRAVENOUS at 08:52

## 2024-10-14 RX ADMIN — CYCLOPENTOLATE HYDROCHLORIDE 1 DROP: 10 SOLUTION/ DROPS OPHTHALMIC at 08:10

## 2024-10-14 RX ADMIN — LIDOCAINE HYDROCHLORIDE 1 APPLICATION: 20 JELLY TOPICAL at 08:25

## 2024-10-14 NOTE — DISCHARGE INSTR - AVS FIRST PAGE
Dr. Clayton Cataract Instructions    Activity:     1.  No Driving until instructed   2.  Keep shield on until seen tomorrow except when administering drops   3.  No heavy lifting   4.  No water in eye     Diet:     1.  Resume normal diet    Normal Symptoms:     1.  Mild Headache   2. Scratchy or picky feeling around eye    Call the office if:     1.  You have any questions or concerns   2.  If eye pain is not relieved by extra strength tylenol    Office phone number:  [phone]      Next appointment:     1.  See Dr Clayton at his office tomorrow as scheduled   __________________________________________________________   2.  Bring blue eye kit with you and eyedrops to the office    A new set of comprehensive instructions will be given and reviewed with you during your office visit tomorrow.

## 2024-10-14 NOTE — OP NOTE
OPERATIVE REPORT    PATIENT NAME: Chantelle Burgos    :  1935  MRN: [id number]  Pt Location: [facility] OR ROOM 01    Surgery Date: 10/14/2024    Surgeons and Role:     * Jef Clayton MD - Primary    Age-related nuclear cataract, left eye [H25.12]    Post-Op Diagnosis Codes:     * Age-related nuclear cataract, left eye [H25.12]    Procedure(s):  aborted cataract surgery with retained lense material in vitreous    Anesthesia Type:   IV Sedation with Anesthesia    Operative Indications:  Age-related nuclear cataract, left eye [H25.12]  Decreased vision to 20/80. With problems driving.  Pt requested cataract sx the left eye    Procedure and Technique:    Procedure Details     The patient was brought in the OR in stable condition and placed on the operative table. The left eye was prepped and draped in the usual sterile manner. Attention was directed to the left eye where a lid speculum was placed. A 2.4 mm clear corneal incision was made temperally. 1/2 cc of 1% lidocaine was irrigated into the anterior chamber followed by viscoat. The pupil stayed small. A Beehler Dilator was used but the pupil  constricted again. A Mylugun Ring was brought up and inserted in to the AC giving good dilation.  The side port incision was placed superiorly. The capsularrhexis was made and the nucleus was hydrodissected with BSS. I began to sculpt the nucleus, but there was generalized zonular weakness and the entire lens  floated back in to the vitreous. At this point there was nothing to do but finish and send her to retina for a PPV and IOL. More viscoat was added to the AC and the Mylugin Ring was removed from the AC. The remaining Viscoat and Provisc was removed from the eye with the I/A. The temporal wound was closed with a 10-0 nylon suture burying the knot. The wounds were hydrated with BSS and found to be water tight. The lid speculum was removed and 2 drops of Gatifloxicin were placed over the cornea. A protective eye shield  was taped over the eye and the patient went to PACU in stable condition. She will use timolol and diamox as I had to leave viscoat in the eye.  I will see the patient in the office tomorrow and refer her to [facility] retina for further tx.   Disposition: PACU   Condition: Stable    SIGNATURE: Jef Clayton MD  DATE: October 14, 2024  TIME: 9:27 AM

## 2024-10-14 NOTE — ANESTHESIA POSTPROCEDURE EVALUATION
Post-Op Assessment Note    CV Status:  Stable  Pain Score: 0    Pain management: adequate       Mental Status:  Awake   Hydration Status:  Stable   PONV Controlled:  None   Airway Patency:  Patent  Two or more mitigation strategies used for obstructive sleep apnea   Post Op Vitals Reviewed: Yes    No anethesia notable event occurred.    Staff: CRNA   Comments: spontaneously breathing, vss, instructed to ask for assistance to ambulate while in recovery, fully endorsed to recovery w/o AC. Pt insutructed no driving, operating heavy machinery or making legal decisions for 24 hours - pt verbalized understanding.          Last Filed PACU Vitals:  Vitals Value Taken Time   Temp     Pulse 72    /57    Resp 12    SpO2 99        Modified Leonard:  No data recorded

## 2024-10-14 NOTE — ANESTHESIA POSTPROCEDURE EVALUATION
Post-Op Assessment Note    CV Status:  Stable    Pain management: adequate       Mental Status:  Alert and awake   Hydration Status:  Euvolemic   PONV Controlled:  Controlled   Airway Patency:  Patent     Post Op Vitals Reviewed: Yes    No anethesia notable event occurred.            Last Filed PACU Vitals:  Vitals Value Taken Time   Temp     Pulse 49 10/14/24 0928   /53 10/14/24 0928   Resp 16 10/14/24 0928   SpO2 94 % 10/14/24 0928       Modified Leonard:  Activity: 2 (10/14/2024  9:28 AM)  Respiration: 2 (10/14/2024  9:28 AM)  Circulation: 2 (10/14/2024  9:28 AM)  Consciousness: 2 (10/14/2024  9:28 AM)  Oxygen Saturation: 2 (10/14/2024  9:28 AM)  Modified Leonard Score: 10 (10/14/2024  9:28 AM)

## 2024-10-14 NOTE — ANESTHESIA PREPROCEDURE EVALUATION
Procedure:  EXTRACTION EXTRACAPSULAR CATARACT PHACO INTRAOCULAR LENS (IOL) (Left: Eye)    Relevant Problems   CARDIO   (+) Aortic valve disorder   (+) Atrial fibrillation (HCC)   (+) Essential hypertension   (+) Hyperlipidemia   (+) Ventricular premature depolarization      NEURO/PSYCH   (+) Multifocal strokes   (+) Stroke (cerebrum) (HCC)             Anesthesia Plan  ASA Score- 3     Anesthesia Type- IV sedation with anesthesia with ASA Monitors.         Additional Monitors:     Airway Plan:            Plan Factors-    Chart reviewed.                      Induction- intravenous.    Postoperative Plan-         Informed Consent- Anesthetic plan and risks discussed with patient.  I personally reviewed this patient with the CRNA. Discussed and agreed on the Anesthesia Plan with the CRNA..

## 2024-11-13 ENCOUNTER — CONSULT (OUTPATIENT)
Dept: CARDIOLOGY CLINIC | Facility: CLINIC | Age: 89
End: 2024-11-13
Payer: MEDICARE

## 2024-11-13 VITALS
HEIGHT: 59 IN | TEMPERATURE: 95.1 F | SYSTOLIC BLOOD PRESSURE: 132 MMHG | WEIGHT: 138.4 LBS | BODY MASS INDEX: 27.9 KG/M2 | OXYGEN SATURATION: 98 % | DIASTOLIC BLOOD PRESSURE: 72 MMHG | HEART RATE: 51 BPM

## 2024-11-13 DIAGNOSIS — I63.10 CEREBROVASCULAR ACCIDENT (CVA) DUE TO EMBOLISM OF PRECEREBRAL ARTERY (HCC): ICD-10-CM

## 2024-11-13 DIAGNOSIS — E78.2 MIXED HYPERLIPIDEMIA: ICD-10-CM

## 2024-11-13 DIAGNOSIS — I48.0 PAROXYSMAL ATRIAL FIBRILLATION (HCC): ICD-10-CM

## 2024-11-13 DIAGNOSIS — I10 ESSENTIAL HYPERTENSION: ICD-10-CM

## 2024-11-13 DIAGNOSIS — I35.0 NONRHEUMATIC AORTIC VALVE STENOSIS: ICD-10-CM

## 2024-11-13 DIAGNOSIS — N18.31 STAGE 3A CHRONIC KIDNEY DISEASE (HCC): Primary | ICD-10-CM

## 2024-11-13 DIAGNOSIS — I35.0 NONRHEUMATIC AORTIC (VALVE) STENOSIS: ICD-10-CM

## 2024-11-13 DIAGNOSIS — I27.20 PULMONARY HYPERTENSION, UNSPECIFIED (HCC): ICD-10-CM

## 2024-11-13 PROBLEM — I48.11 LONGSTANDING PERSISTENT ATRIAL FIBRILLATION (HCC): Status: ACTIVE | Noted: 2023-12-30

## 2024-11-13 PROBLEM — E78.5 HYPERLIPIDEMIA: Status: RESOLVED | Noted: 2020-01-14 | Resolved: 2024-11-13

## 2024-11-13 PROCEDURE — 93000 ELECTROCARDIOGRAM COMPLETE: CPT | Performed by: INTERNAL MEDICINE

## 2024-11-13 PROCEDURE — 99215 OFFICE O/P EST HI 40 MIN: CPT | Performed by: INTERNAL MEDICINE

## 2024-11-13 RX ORDER — IBUPROFEN 800 MG/1
TABLET, FILM COATED ORAL
COMMUNITY
Start: 2024-10-16

## 2024-11-13 NOTE — ASSESSMENT & PLAN NOTE
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  Continue statins and anticoagulation therapy.

## 2024-11-13 NOTE — ASSESSMENT & PLAN NOTE
LDL 83, HDL 50 and triglycerides 153.  Continue high intensity statins.  Follow-up with Dr. Carmen for repeat lipids this year.  Continue current diet and exercise.

## 2024-11-13 NOTE — PATIENT INSTRUCTIONS
Continue current medicines.  Avoid taking ibuprofen or aspirin due to risk of bleeding since you are taking blood thinners.

## 2024-11-13 NOTE — ASSESSMENT & PLAN NOTE
Recent echo reviewed: EF 60%, mild aortic stenosis.  Mean gradient 11 and valve area 1.8 cm².  Findings reviewed with the patient.  No intervention indicated.  Follow-up echo next year.  Orders:    Ambulatory Referral to Cardiology    POCT ECG

## 2024-11-13 NOTE — PROGRESS NOTES
[facility] [place]  [facility] [address]  Phone#  [phone]  Fax#  [phone]  [facility] Cardiology Office Consultation             NAME: Chantelle Burgos  AGE: 89 y.o. SEX: female   : 1935   MRN: [id number]    DATE: 2024  TIME: 11:03 AM    Cardiology Problem list:  Permanent atrial fibrillation  Embolic CVA   Hypertension  PVCs  Aortic stenosis  : Echo-EF 60, aortic valve mean gradient 11  Dyslipidemia: Statins    Assessment and recommendations    Assessment & Plan  Nonrheumatic aortic (valve) stenosis  Recent echo reviewed: EF 60%, mild aortic stenosis.  Mean gradient 11 and valve area 1.8 cm².  Findings reviewed with the patient.  No intervention indicated.  Follow-up echo next year.  Orders:    Ambulatory Referral to Cardiology    POCT ECG    Stage 3a chronic kidney disease (HCC)  Lab Results   Component Value Date    EGFR 54 2023    EGFR 54 2023    EGFR 45 2023    CREATININE 0.94 2023    CREATININE 0.94 2023    CREATININE 1.10 2023   GFR is mildly reduced. Xarelto dose is appropriate.          Pulmonary hypertension, unspecified (HCC)  PA pressure 44 on recent echo.   Asymptomatic.             Paroxysmal atrial fibrillation (HCC)  Previously was not on anticoagulation therapy.  She had a CVA in .  Now on rivaroxaban 20 mg daily.  Her creatinine is now 0.94.  Monitor for any bleeding.  Advised to avoid any NSAIDs.         Essential hypertension  BP Readings from Last 3 Encounters:   24 132/72   10/14/24 110/53   24 130/66   Blood pressure is controlled.  Continue lifestyle modification.   Medical therapy reviewed.  Currently on metoprolol.  Close blood pressure monitoring.         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  Continue statins and anticoagulation therapy.         Mixed hyperlipidemia  LDL 83, HDL 50  and triglycerides 153.  Continue high intensity statins.  Follow-up with Dr. Carmen for repeat lipids this year.  Continue current diet and exercise.                HPI:    Chantelle Burgos is a 89 y.o.-year-old female who presents to the cardiology clinic for initial consultation.    She has been referred here by Dr. Carmen.  She has a history of longstanding paroxysmal atrial fibrillation currently managed with rate control and anticoagulation who is status post CVA in 12/23.   Echo in 10/22 showed EF of 60% with grade 2 echo in 10/22 showed EF of 60% with grade 2 diastolic dysfunction and mild aortic sclerosis.  Follow-up echo in 12/23 showed mild aortic stenosis with mean gradient of 11 and a valve area 1.83 cm².  PASP was 43.  EF was 60% with trivial pericardial effusion seen.  Study personally reviewed.  EKG at that time showed sinus rhythm.  She was last seen by cardiology during the hospital stay in 12/23 during her CVA.  She was found to have multiple infarcts in the left parietal lobe and periventricular occipital area on the CT and MRI.  MRA showed no obstructive disease.  Carotid Doppler also was normal.  Previous cardiac care has been at [facility] with Dr. Salazar.  Last office notes from 2/24 reviewed.  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  She is on moderate intensity statins for dyslipidemia.  Her LDL cholesterol is well-controlled.  Triglycerides were mildly elevated previously.  HDL is normal.  LFTs are normal.  Labs personally reviewed.  She regularly follows up with Dr. Carmen from internal medicine.  She is currently on anticoagulation therapy and does take ibuprofen on an as-needed basis.  Takes this only occasionally.  No bleeding reported.  She was previously walking about 2 miles a day but currently she stays busy throughout the day but does not have a structured exercise program.  Her EKG today shows sinus bradycardia.  Echo findings were  reviewed with the patient.    Past history, family history, social history, current medications, vital signs, recent lab and imaging studies and  prior cardiology studies reviewed independently on this visit.    Allergies   Allergen Reactions    Penicillins Hives    Sulfa Antibiotics Other (See Comments)     Lost her sense of smell       Current Outpatient Medications:     ascorbic acid (VITAMIN C) 500 MG tablet, Take 500 mg by mouth daily, Disp: , Rfl:     Cholecalciferol 50 MCG (2000 UT) CAPS, Take 1 capsule by mouth in the morning, Disp: , Rfl:     DULoxetine (CYMBALTA) 60 mg delayed release capsule, Take 60 mg by mouth daily, Disp: , Rfl:     ibuprofen (MOTRIN) 800 mg tablet, TAKE 1 TABLET EVERY 8 HOURS AS NEEDED FOR SEVERE PAIN, Disp: , Rfl:     metoprolol tartrate (LOPRESSOR) 50 mg tablet, Take 50 mg by mouth 2 (two) times a day, Disp: , Rfl:     prednisoLONE acetate (PRED FORTE) 1 % ophthalmic suspension, Administer 1 drop to the right eye 4 (four) times a day, Disp: , Rfl:     rivaroxaban (Xarelto) 20 mg tablet, Take 1 tablet (20 mg total) by mouth daily with breakfast, Disp: 30 tablet, Rfl: 0    rosuvastatin (CRESTOR) 20 MG tablet, TAKE 1 TABLET BY MOUTH EVERY DAY *STOP PRAVASTATIN 80MG, Disp: , Rfl:     Past Medical History:   Diagnosis Date    Heart murmur     Hyperlipidemia     Hypertension     Stroke (HCC) 11/2023    TIA     Past Surgical History:   Procedure Laterality Date    CYSTOCELE REPAIR N/A     VT XCAPSL CTRC RMVL INSJ IO LENS PROSTH W/O ECP Left 10/14/2024    Procedure: aborted cataract surgery with retained lense material in vitreous;  Surgeon: Jef Clayton MD;  Location: [facility] MAIN OR;  Service: Ophthalmology    SPINE SURGERY  2009    Lumbar spinal stenosis     Family History   Problem Relation Age of Onset    Heart attack Mother      Social History   reports that she has never smoked. She has never used smokeless tobacco. She reports that she does not currently use alcohol. She reports  that she does not use drugs.     Review of Systems   Constitutional:  Negative for fatigue.   HENT: Negative.     Eyes: Negative.    Respiratory:  Negative for shortness of breath.    Cardiovascular:  Negative for chest pain, palpitations and leg swelling.   Gastrointestinal: Negative.    Endocrine: Negative.    Musculoskeletal:  Positive for arthralgias. Negative for gait problem.   Neurological:  Negative for syncope and weakness.   Hematological: Negative.    Psychiatric/Behavioral:  Negative for sleep disturbance.    All other systems reviewed and are negative.      Objective:     Vitals:    11/13/24 1040   BP: 132/72   Pulse: (!) 51   Temp: (!) 95.1 °F (35.1 °C)   SpO2: 98%     Physical Exam  Vitals reviewed.   Constitutional:       General: She is not in acute distress.  HENT:      Head: Normocephalic.   Cardiovascular:      Rate and Rhythm: Normal rate and regular rhythm.      Heart sounds: S1 normal and S2 normal. Murmur heard.      Crescendo systolic murmur is present with a grade of 2/6.   Pulmonary:      Breath sounds: No wheezing or rales.   Musculoskeletal:         General: No swelling.      Right lower leg: No edema.      Left lower leg: No edema.   Skin:     General: Skin is warm.   Neurological:      General: No focal deficit present.      Mental Status: She is alert.   Psychiatric:         Mood and Affect: Mood normal.         Pertinent Laboratory/Diagnostic Studies:    Laboratory studies reviewed personally by Rohan Jeter MD    BMP:   Lab Results   Component Value Date    SODIUM 140 12/31/2023    K 4.0 12/31/2023     12/31/2023    CO2 27 12/31/2023    BUN 23 12/31/2023    CREATININE 0.94 12/31/2023    GLUC 96 12/31/2023    CALCIUM 9.5 12/31/2023     CBC:  Lab Results   Component Value Date    WBC 6.37 12/31/2023    HGB 13.5 12/31/2023    HCT 40.9 12/31/2023    MCV 99 (H) 12/31/2023     12/31/2023     Lipid Profile:   Lab Results   Component Value Date    HDL 50 12/30/2023     Lab  "Results   Component Value Date    LDLCALC 83 12/30/2023     Lab Results   Component Value Date    TRIG 153 (H) 12/30/2023      Other labs:  Lab Results   Component Value Date    SQD3ZCHGHGJB 4.805 (H) 12/30/2023     Lab Results   Component Value Date    ALT 19 12/31/2023    AST 22 12/31/2023     Lab Results   Component Value Date    HGBA1C 6.0 (H) 12/30/2023       Imaging Studies:     Pertinent cardiac studies and imaging studies were personally reviewed on this visit and results summarized.    I have spent a total time of 65  minutes in caring for this patient on the day of the visit/encounter including reviewing Diagnostic results, Instructions for management, Patient education, Risk factor reductions, Impressions, Documenting in the medical record, Reviewing tests, medicine, procedures  , and Obtaining or reviewing history.  Previous extensive history, imaging studies and diagnostic results are summarized and discussed with the patient.    Portions of the record may have been created with voice recognition software.  Occasional wrong word or \"sound alike\" substitutions may have occurred due to the inherent limitations of voice recognition software.  Read the chart carefully and recognize, using context, where substitutions have occurred. Please reach out to me directly for any clarifications.  "

## 2024-11-13 NOTE — ASSESSMENT & PLAN NOTE
BP Readings from Last 3 Encounters:   11/13/24 132/72   10/14/24 110/53   07/19/24 130/66   Blood pressure is controlled.  Continue lifestyle modification.   Medical therapy reviewed.  Currently on metoprolol.  Close blood pressure monitoring.

## 2024-11-13 NOTE — ASSESSMENT & PLAN NOTE
Lab Results   Component Value Date    EGFR 54 12/31/2023    EGFR 54 12/30/2023    EGFR 45 12/29/2023    CREATININE 0.94 12/31/2023    CREATININE 0.94 12/30/2023    CREATININE 1.10 12/29/2023   GFR is mildly reduced. Xarelto dose is appropriate.

## 2024-11-13 NOTE — LETTER
2024     Buddy Carmen MD  [address]  [facility] [address]    Patient: Chantelle Burgos   YOB: 1935   Date of Visit: 2024       Dear Dr. Carmen:    Thank you for referring Chantelle Burgos to me for evaluation. Below are my notes for this consultation.    If you have questions, please do not hesitate to call me. I look forward to following your patient along with you.         Sincerely,        Rohan Jeter MD        CC: No Recipients    Rohan Jeter MD  2024 11:08 AM  Sign when Signing Visit  [facility] [address] [address]  Phone#  [phone]  Fax#  [phone]  [facility] Cardiology Office Consultation             NAME: Chantelle Burgos  AGE: 89 y.o. SEX: female   : 1935   MRN: [id number]    DATE: 2024  TIME: 11:03 AM    Cardiology Problem list:  Permanent atrial fibrillation  Embolic CVA   Hypertension  PVCs  Aortic stenosis  : Echo-EF 60, aortic valve mean gradient 11  Dyslipidemia: Statins    Assessment and recommendations    Assessment & Plan  Nonrheumatic aortic (valve) stenosis  Recent echo reviewed: EF 60%, mild aortic stenosis.  Mean gradient 11 and valve area 1.8 cm².  Findings reviewed with the patient.  No intervention indicated.  Follow-up echo next year.  Orders:  •  Ambulatory Referral to Cardiology  •  POCT ECG    Stage 3a chronic kidney disease (HCC)  Lab Results   Component Value Date    EGFR 54 2023    EGFR 54 2023    EGFR 45 2023    CREATININE 0.94 2023    CREATININE 0.94 2023    CREATININE 1.10 2023   GFR is mildly reduced. Xarelto dose is appropriate.          Pulmonary hypertension, unspecified (HCC)  PA pressure 44 on recent echo.   Asymptomatic.             Paroxysmal atrial fibrillation (HCC)  Previously was not on anticoagulation therapy.  She had a CVA in .  Now on rivaroxaban 20 mg daily.  Her creatinine is now 0.94.  Monitor for  any bleeding.  Advised to avoid any NSAIDs.         Essential hypertension  BP Readings from Last 3 Encounters:   11/13/24 132/72   10/14/24 110/53   07/19/24 130/66   Blood pressure is controlled.  Continue lifestyle modification.   Medical therapy reviewed.  Currently on metoprolol.  Close blood pressure monitoring.         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  Continue statins and anticoagulation therapy.         Mixed hyperlipidemia  LDL 83, HDL 50 and triglycerides 153.  Continue high intensity statins.  Follow-up with Dr. Carmen for repeat lipids this year.  Continue current diet and exercise.                HPI:    Chantelle Burgos is a 89 y.o.-year-old female who presents to the cardiology clinic for initial consultation.    She has been referred here by Dr. Carmen.  She has a history of longstanding paroxysmal atrial fibrillation currently managed with rate control and anticoagulation who is status post CVA in 12/23.   Echo in 10/22 showed EF of 60% with grade 2 echo in 10/22 showed EF of 60% with grade 2 diastolic dysfunction and mild aortic sclerosis.  Follow-up echo in 12/23 showed mild aortic stenosis with mean gradient of 11 and a valve area 1.83 cm².  PASP was 43.  EF was 60% with trivial pericardial effusion seen.  Study personally reviewed.  EKG at that time showed sinus rhythm.  She was last seen by cardiology during the hospital stay in 12/23 during her CVA.  She was found to have multiple infarcts in the left parietal lobe and periventricular occipital area on the CT and MRI.  MRA showed no obstructive disease.  Carotid Doppler also was normal.  Previous cardiac care has been at [facility] with Dr. Salazar.  Last office notes from 2/24 reviewed.  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  She is on moderate intensity statins for dyslipidemia.  Her LDL cholesterol is  well-controlled.  Triglycerides were mildly elevated previously.  HDL is normal.  LFTs are normal.  Labs personally reviewed.  She regularly follows up with Dr. Carmen from internal medicine.  She is currently on anticoagulation therapy and does take ibuprofen on an as-needed basis.  Takes this only occasionally.  No bleeding reported.  She was previously walking about 2 miles a day but currently she stays busy throughout the day but does not have a structured exercise program.  Her EKG today shows sinus bradycardia.  Echo findings were reviewed with the patient.    Past history, family history, social history, current medications, vital signs, recent lab and imaging studies and  prior cardiology studies reviewed independently on this visit.    Allergies   Allergen Reactions   • Penicillins Hives   • Sulfa Antibiotics Other (See Comments)     Lost her sense of smell       Current Outpatient Medications:   •  ascorbic acid (VITAMIN C) 500 MG tablet, Take 500 mg by mouth daily, Disp: , Rfl:   •  Cholecalciferol 50 MCG (2000 UT) CAPS, Take 1 capsule by mouth in the morning, Disp: , Rfl:   •  DULoxetine (CYMBALTA) 60 mg delayed release capsule, Take 60 mg by mouth daily, Disp: , Rfl:   •  ibuprofen (MOTRIN) 800 mg tablet, TAKE 1 TABLET EVERY 8 HOURS AS NEEDED FOR SEVERE PAIN, Disp: , Rfl:   •  metoprolol tartrate (LOPRESSOR) 50 mg tablet, Take 50 mg by mouth 2 (two) times a day, Disp: , Rfl:   •  prednisoLONE acetate (PRED FORTE) 1 % ophthalmic suspension, Administer 1 drop to the right eye 4 (four) times a day, Disp: , Rfl:   •  rivaroxaban (Xarelto) 20 mg tablet, Take 1 tablet (20 mg total) by mouth daily with breakfast, Disp: 30 tablet, Rfl: 0  •  rosuvastatin (CRESTOR) 20 MG tablet, TAKE 1 TABLET BY MOUTH EVERY DAY *STOP PRAVASTATIN 80MG, Disp: , Rfl:     Past Medical History:   Diagnosis Date   • Heart murmur    • Hyperlipidemia    • Hypertension    • Stroke (HCC) 11/2023    TIA     Past Surgical History:    Procedure Laterality Date   • CYSTOCELE REPAIR N/A    • TN XCAPSL CTRC RMVL INSJ IO LENS PROSTH W/O ECP Left 10/14/2024    Procedure: aborted cataract surgery with retained lense material in vitreous;  Surgeon: Jef Clayton MD;  Location: [facility] MAIN OR;  Service: Ophthalmology   • SPINE SURGERY  2009    Lumbar spinal stenosis     Family History   Problem Relation Age of Onset   • Heart attack Mother      Social History   reports that she has never smoked. She has never used smokeless tobacco. She reports that she does not currently use alcohol. She reports that she does not use drugs.     Review of Systems   Constitutional:  Negative for fatigue.   HENT: Negative.     Eyes: Negative.    Respiratory:  Negative for shortness of breath.    Cardiovascular:  Negative for chest pain, palpitations and leg swelling.   Gastrointestinal: Negative.    Endocrine: Negative.    Musculoskeletal:  Positive for arthralgias. Negative for gait problem.   Neurological:  Negative for syncope and weakness.   Hematological: Negative.    Psychiatric/Behavioral:  Negative for sleep disturbance.    All other systems reviewed and are negative.      Objective:     Vitals:    11/13/24 1040   BP: 132/72   Pulse: (!) 51   Temp: (!) 95.1 °F (35.1 °C)   SpO2: 98%     Physical Exam  Vitals reviewed.   Constitutional:       General: She is not in acute distress.  HENT:      Head: Normocephalic.   Cardiovascular:      Rate and Rhythm: Normal rate and regular rhythm.      Heart sounds: S1 normal and S2 normal. Murmur heard.      Crescendo systolic murmur is present with a grade of 2/6.   Pulmonary:      Breath sounds: No wheezing or rales.   Musculoskeletal:         General: No swelling.      Right lower leg: No edema.      Left lower leg: No edema.   Skin:     General: Skin is warm.   Neurological:      General: No focal deficit present.      Mental Status: She is alert.   Psychiatric:         Mood and Affect: Mood normal.         Pertinent  "Laboratory/Diagnostic Studies:    Laboratory studies reviewed personally by Rohan Jeter MD    BMP:   Lab Results   Component Value Date    SODIUM 140 12/31/2023    K 4.0 12/31/2023     12/31/2023    CO2 27 12/31/2023    BUN 23 12/31/2023    CREATININE 0.94 12/31/2023    GLUC 96 12/31/2023    CALCIUM 9.5 12/31/2023     CBC:  Lab Results   Component Value Date    WBC 6.37 12/31/2023    HGB 13.5 12/31/2023    HCT 40.9 12/31/2023    MCV 99 (H) 12/31/2023     12/31/2023     Lipid Profile:   Lab Results   Component Value Date    HDL 50 12/30/2023     Lab Results   Component Value Date    LDLCALC 83 12/30/2023     Lab Results   Component Value Date    TRIG 153 (H) 12/30/2023      Other labs:  Lab Results   Component Value Date    [id number] 4.805 (H) 12/30/2023     Lab Results   Component Value Date    ALT 19 12/31/2023    AST 22 12/31/2023     Lab Results   Component Value Date    HGBA1C 6.0 (H) 12/30/2023       Imaging Studies:     Pertinent cardiac studies and imaging studies were personally reviewed on this visit and results summarized.    I have spent a total time of 65  minutes in caring for this patient on the day of the visit/encounter including reviewing Diagnostic results, Instructions for management, Patient education, Risk factor reductions, Impressions, Documenting in the medical record, Reviewing tests, medicine, procedures  , and Obtaining or reviewing history.  Previous extensive history, imaging studies and diagnostic results are summarized and discussed with the patient.    Portions of the record may have been created with voice recognition software.  Occasional wrong word or \"sound alike\" substitutions may have occurred due to the inherent limitations of voice recognition software.  Read the chart carefully and recognize, using context, where substitutions have occurred. Please reach out to me directly for any clarifications.  "

## 2024-11-13 NOTE — ASSESSMENT & PLAN NOTE
Previously was not on anticoagulation therapy.  She had a CVA in 12/23.  Now on rivaroxaban 20 mg daily.  Her creatinine is now 0.94.  Monitor for any bleeding.  Advised to avoid any NSAIDs.

## 2025-01-21 ENCOUNTER — APPOINTMENT (OUTPATIENT)
Dept: LAB | Facility: HOSPITAL | Age: 89
End: 2025-01-21
Attending: INTERNAL MEDICINE
Payer: MEDICARE

## 2025-01-21 DIAGNOSIS — N30.00 ACUTE CYSTITIS WITHOUT HEMATURIA: ICD-10-CM

## 2025-01-21 LAB
BACTERIA UR QL AUTO: ABNORMAL /HPF
BILIRUB UR QL STRIP: NEGATIVE
CLARITY UR: CLEAR
COLOR UR: YELLOW
GLUCOSE UR STRIP-MCNC: NEGATIVE MG/DL
HGB UR QL STRIP.AUTO: NEGATIVE
KETONES UR STRIP-MCNC: NEGATIVE MG/DL
LEUKOCYTE ESTERASE UR QL STRIP: NEGATIVE
NITRITE UR QL STRIP: NEGATIVE
NON-SQ EPI CELLS URNS QL MICRO: ABNORMAL /HPF
PH UR STRIP.AUTO: 7.5 [PH]
PROT UR STRIP-MCNC: ABNORMAL MG/DL
RBC #/AREA URNS AUTO: ABNORMAL /HPF
SP GR UR STRIP.AUTO: 1.02 (ref 1–1.03)
UROBILINOGEN UR QL STRIP.AUTO: 1 E.U./DL
WBC #/AREA URNS AUTO: ABNORMAL /HPF

## 2025-01-21 PROCEDURE — 87186 SC STD MICRODIL/AGAR DIL: CPT

## 2025-01-21 PROCEDURE — 87086 URINE CULTURE/COLONY COUNT: CPT

## 2025-01-21 PROCEDURE — 87147 CULTURE TYPE IMMUNOLOGIC: CPT

## 2025-01-21 PROCEDURE — 81001 URINALYSIS AUTO W/SCOPE: CPT

## 2025-01-23 LAB
BACTERIA UR CULT: ABNORMAL
BACTERIA UR CULT: ABNORMAL

## 2025-01-29 ENCOUNTER — HOSPITAL ENCOUNTER (OUTPATIENT)
Dept: ULTRASOUND IMAGING | Facility: HOSPITAL | Age: 89
Discharge: HOME | End: 2025-01-29
Payer: MEDICARE

## 2025-01-29 DIAGNOSIS — N30.00 ACUTE CYSTITIS WITHOUT HEMATURIA: ICD-10-CM

## 2025-01-29 PROCEDURE — 76857 US EXAM PELVIC LIMITED: CPT

## 2025-01-29 PROCEDURE — 76830 TRANSVAGINAL US NON-OB: CPT

## 2025-01-29 PROCEDURE — 76856 US EXAM PELVIC COMPLETE: CPT

## 2025-01-30 ENCOUNTER — OFFICE VISIT (OUTPATIENT)
Dept: UROLOGY | Facility: CLINIC | Age: 89
End: 2025-01-30
Payer: MEDICARE

## 2025-01-30 VITALS
DIASTOLIC BLOOD PRESSURE: 78 MMHG | BODY MASS INDEX: 27.82 KG/M2 | WEIGHT: 138 LBS | SYSTOLIC BLOOD PRESSURE: 116 MMHG | HEIGHT: 59 IN | HEART RATE: 44 BPM | OXYGEN SATURATION: 99 %

## 2025-01-30 DIAGNOSIS — R30.0 DYSURIA: ICD-10-CM

## 2025-01-30 DIAGNOSIS — R39.15 URGENCY OF URINATION: Primary | ICD-10-CM

## 2025-01-30 LAB — POST-VOID RESIDUAL VOLUME, ML POC: 12 ML

## 2025-01-30 PROCEDURE — 99213 OFFICE O/P EST LOW 20 MIN: CPT

## 2025-01-30 PROCEDURE — 51798 US URINE CAPACITY MEASURE: CPT

## 2025-01-30 RX ORDER — ALPRAZOLAM 0.25 MG/1
TABLET ORAL
COMMUNITY

## 2025-01-30 RX ORDER — FLUTICASONE PROPIONATE 50 MCG
SPRAY, SUSPENSION (ML) NASAL
COMMUNITY

## 2025-01-30 RX ORDER — ASPIRIN 81 MG/1
TABLET ORAL
COMMUNITY

## 2025-01-30 RX ORDER — SIMVASTATIN 40 MG
TABLET ORAL
COMMUNITY

## 2025-01-30 NOTE — ASSESSMENT & PLAN NOTE
Mixed stress and urge urinary incontinence  -Urgency, urge incontinence, nocturia x 1  -Patient did pelvic floor PT for 6 weeks; did not notice any improvement in HANK.   -Proper hydration and bowel management; patient is having daily bowel movements  -We discussed initiating a trial of a beta 3 agonist, discussed side effect profile. Patient states she is open to a trial of medication pending urine culture and US results.   -We will return to clinic in 4 months for follow-up.        Orders:    POCT Measure PVR

## 2025-01-30 NOTE — PROGRESS NOTES
Name: Chantelle Burgos      : 1935      MRN: [id number]  Encounter Provider: DARWIN Justice  Encounter Date: 2025   Encounter department: [facility] UROLOGY [place]  :  Assessment & Plan  Urgency of urination  Mixed stress and urge urinary incontinence  -Urgency, urge incontinence, nocturia x 1  -Patient did pelvic floor PT for 6 weeks; did not notice any improvement in HANK.   -Proper hydration and bowel management; patient is having daily bowel movements  -We discussed initiating a trial of a beta 3 agonist, discussed side effect profile. Patient states she is open to a trial of medication pending urine culture and US results.   -We will return to clinic in 4 months for follow-up.        Orders:    POCT Measure PVR    Dysuria    Orders:    Urinalysis with microscopic; Future    Urine culture; Future        History of Present Illness   Chantelle Burgos is a 89 y.o. female here with history of urgency and mixed stress and urge urinary incontinence presenting for follow-up. She wears 1-2 pads per day. She states that her leakage is mostly with urgency. She denies dysuria, frequency, and gross hematuria. She does report that she is having regular bowel movements and maintaining proper hydration.      Patient states she was recently treated for UTI. UC grew 40-49,000 staphylococcus aureus, 30-39,000 Group B Strep. Patient states felt like UTI symptoms never really went away. Denies fevers and chills. Denies flank pain, gross hematuria.           Review of Systems   Constitutional:  Negative for chills and fever.   HENT:  Negative for ear pain and sore throat.    Eyes:  Negative for pain and visual disturbance.   Respiratory:  Negative for cough and shortness of breath.    Cardiovascular:  Negative for chest pain and palpitations.   Gastrointestinal:  Negative for abdominal pain, nausea and vomiting.   Genitourinary:  Positive for dysuria and urgency. Negative for difficulty urinating, flank pain,  "frequency and hematuria.   Musculoskeletal:  Negative for arthralgias and back pain.   Skin:  Negative for color change and rash.   Neurological:  Negative for seizures and syncope.   All other systems reviewed and are negative.    Current Outpatient Medications on File Prior to Visit   Medication Sig Dispense Refill    ascorbic acid (VITAMIN C) 500 MG tablet Take 500 mg by mouth daily      Cholecalciferol 50 MCG (2000 UT) CAPS Take 1 capsule by mouth in the morning      DULoxetine (CYMBALTA) 60 mg delayed release capsule Take 60 mg by mouth daily      ibuprofen (MOTRIN) 800 mg tablet TAKE 1 TABLET EVERY 8 HOURS AS NEEDED FOR SEVERE PAIN      metoprolol tartrate (LOPRESSOR) 50 mg tablet Take 50 mg by mouth 2 (two) times a day      rivaroxaban (Xarelto) 20 mg tablet Take 1 tablet (20 mg total) by mouth daily with breakfast 30 tablet 0    rosuvastatin (CRESTOR) 20 MG tablet TAKE 1 TABLET BY MOUTH EVERY DAY *STOP PRAVASTATIN 80MG      ALPRAZolam (Xanax) 0.25 mg tablet Take by mouth (Patient not taking: Reported on 1/30/2025)      aspirin (ECOTRIN LOW STRENGTH) 81 mg EC tablet Take by mouth (Patient not taking: Reported on 1/30/2025)      fluticasone (FLONASE) 50 mcg/act nasal spray into each nostril (Patient not taking: Reported on 1/30/2025)      prednisoLONE acetate (PRED FORTE) 1 % ophthalmic suspension Administer 1 drop to the right eye 4 (four) times a day (Patient not taking: Reported on 1/30/2025)      simvastatin (ZOCOR) 40 mg tablet Take by mouth (Patient not taking: Reported on 1/30/2025)       No current facility-administered medications on file prior to visit.         Objective   /78 (BP Location: Left arm, Patient Position: Sitting, Cuff Size: Adult)   Pulse (!) 44   Ht 4' 11\" (1.499 m) Comment: verbal  Wt 62.6 kg (138 lb)   SpO2 99%   BMI 27.87 kg/m²     Physical Exam  Vitals and nursing note reviewed.   Constitutional:       General: She is not in acute distress.     Appearance: She is " "well-developed.   HENT:      Head: Normocephalic and atraumatic.   Eyes:      Conjunctiva/sclera: Conjunctivae normal.   Cardiovascular:      Rate and Rhythm: Normal rate and regular rhythm.      Heart sounds: No murmur heard.  Pulmonary:      Effort: Pulmonary effort is normal. No respiratory distress.      Breath sounds: Normal breath sounds.   Abdominal:      Palpations: Abdomen is soft.      Tenderness: There is no abdominal tenderness.   Musculoskeletal:         General: No swelling.      Cervical back: Neck supple.   Skin:     General: Skin is warm and dry.      Capillary Refill: Capillary refill takes less than 2 seconds.   Neurological:      Mental Status: She is alert and oriented to person, place, and time.   Psychiatric:         Mood and Affect: Mood normal.      \    Results  No results found for: \"PSA\"  Lab Results   Component Value Date    CALCIUM 9.5 12/31/2023    K 4.0 12/31/2023    CO2 27 12/31/2023     12/31/2023    BUN 23 12/31/2023    CREATININE 0.94 12/31/2023     Lab Results   Component Value Date    WBC 6.37 12/31/2023    HGB 13.5 12/31/2023    HCT 40.9 12/31/2023    MCV 99 (H) 12/31/2023     12/31/2023       Office Urine Dip  Recent Results (from the past hour)   POCT Measure PVR    Collection Time: 01/30/25 10:23 AM   Result Value Ref Range    POST-VOID RESIDUAL VOLUME, ML POC 12 mL   ]      "

## 2025-03-12 ENCOUNTER — CONSULT (OUTPATIENT)
Dept: OBGYN CLINIC | Facility: CLINIC | Age: 89
End: 2025-03-12
Payer: MEDICARE

## 2025-03-12 VITALS
SYSTOLIC BLOOD PRESSURE: 118 MMHG | HEIGHT: 59 IN | DIASTOLIC BLOOD PRESSURE: 80 MMHG | BODY MASS INDEX: 28.02 KG/M2 | WEIGHT: 139 LBS

## 2025-03-12 DIAGNOSIS — N83.201 UNSPECIFIED OVARIAN CYST, RIGHT SIDE: ICD-10-CM

## 2025-03-12 DIAGNOSIS — R32 INCONTINENCE IN FEMALE: Primary | ICD-10-CM

## 2025-03-12 DIAGNOSIS — N90.89 VULVAR IRRITATION: ICD-10-CM

## 2025-03-12 DIAGNOSIS — N81.10 CYSTOCELE, UNSPECIFIED: ICD-10-CM

## 2025-03-12 PROCEDURE — 99203 OFFICE O/P NEW LOW 30 MIN: CPT

## 2025-03-12 RX ORDER — NYSTATIN AND TRIAMCINOLONE ACETONIDE 100000; 1 [USP'U]/G; MG/G
OINTMENT TOPICAL 2 TIMES DAILY
Qty: 30 G | Refills: 0 | Status: SHIPPED | OUTPATIENT
Start: 2025-03-12 | End: 2025-03-19 | Stop reason: ALTCHOICE

## 2025-03-12 NOTE — PROGRESS NOTES
"- Patient understands to repeat pelvic ultrasound in 1 year to f/u on ovarian cyst  - She will begin use of Mycolog using this twice daily for 7 days applying a thin layer to external vulva  - She would like to see Urogynecology to review options for cystocele/incontinence, she has tried PT in the past and feels this did not help her symptoms at all.       ASSESSMENT/PLAN:    Encounter Diagnosis     ICD-10-CM    1. Incontinence in female  R32 Ambulatory Referral to Urogynecology      2. Unspecified ovarian cyst, right side  N83.201 Ambulatory Referral to Gynecologic Oncology     US pelvis complete w transvaginal      3. Cystocele, unspecified  N81.10 Ambulatory Referral to Urogynecology      4. Vulvar irritation  N90.89 nystatin-triamcinolone (MYCOLOG-II) ointment        SUBJECTIVE/HPI:      Patient ID: Chantelle Burgos 1935     Chantelle Burgos is a 89 y.o.  presenting to the office to discuss ultrasound findings of ovarian cyst of right ovary. She is asymptomatic from this, denying pelvic pain/discomfort, bloating. We discussed recommendation for repeat imaging in 1 year. She c/o worsening incontinence today. She has history of cystocele and prior repair \"years ago\". She was previously seeing pelvic floor PT and she did not feel this was helpful for her as \"all we worked on was kegels\". She has incontinence at all times, especially with laughing/coughing. She wears a pad all day. She c/o vulvar itching/irritation for the past few days.    US pelvis, 2025:   \"FINDINGS:  UTERUS:  The uterus is anteverted in position, measuring 4.9 x 2.7 x 4.2 cm.  The uterus has a smooth contour and echotexture.  The cervix appears within normal limits.     ENDOMETRIUM:  The endometrial echo complex has an AP caliber of 3.0 mm.  No sonographically evident endometrial mass.     OVARIES/ADNEXA:  Right ovary: 5.3 x 4.7 x 3.8 cm. 49.8 mL.  Ovarian Doppler flow is within normal limits.  5.0 x 2.6 x 4.4 cm simple ovarian cyst.   " "  Left ovary: Not visualized, probably obscured by overlying bowel gas.     OTHER:  No free fluid or loculated fluid collections.     IMPRESSION:  5.0 x 2.6 x 4.4 cm simple right ovarian cyst. O-RADS 2 = Almost certainly benign.  Follow-up ultrasound in 12 months.     Left ovary not visualized, probably obscured by overlying bowel gas.\"      HISTORY:    Patient Active Problem List   Diagnosis    Nonrheumatic aortic valve stenosis    Essential hypertension    Mixed hyperlipidemia    Pulmonary hypertension, unspecified (HCC)    Ventricular premature depolarization    Stroke (cerebrum) (HCC)    Multifocal strokes    Paroxysmal atrial fibrillation (HCC)    Stage 3a chronic kidney disease (HCC)    Urgency of urination     Allergies   Allergen Reactions    Penicillins Hives    Sulfa Antibiotics Other (See Comments)     Lost her sense of smell    Tramadol Tachycardia       Current Outpatient Medications:     ascorbic acid (VITAMIN C) 500 MG tablet, Take 500 mg by mouth daily, Disp: , Rfl:     Cholecalciferol 50 MCG (2000 UT) CAPS, Take 1 capsule by mouth in the morning, Disp: , Rfl:     DULoxetine (CYMBALTA) 60 mg delayed release capsule, Take 60 mg by mouth daily, Disp: , Rfl:     ibuprofen (MOTRIN) 800 mg tablet, TAKE 1 TABLET EVERY 8 HOURS AS NEEDED FOR SEVERE PAIN, Disp: , Rfl:     metoprolol tartrate (LOPRESSOR) 50 mg tablet, Take 50 mg by mouth 2 (two) times a day, Disp: , Rfl:     nystatin-triamcinolone (MYCOLOG-II) ointment, Apply topically 2 (two) times a day, Disp: 30 g, Rfl: 0    prednisoLONE acetate (PRED FORTE) 1 % ophthalmic suspension, Administer 1 drop to the right eye 4 (four) times a day, Disp: , Rfl:     rivaroxaban (Xarelto) 20 mg tablet, Take 1 tablet (20 mg total) by mouth daily with breakfast, Disp: 30 tablet, Rfl: 0    rosuvastatin (CRESTOR) 20 MG tablet, TAKE 1 TABLET BY MOUTH EVERY DAY *STOP PRAVASTATIN 80MG, Disp: , Rfl:     ALPRAZolam (Xanax) 0.25 mg tablet, Take by mouth (Patient not taking: " "Reported on 2025), Disp: , Rfl:     aspirin (ECOTRIN LOW STRENGTH) 81 mg EC tablet, Take by mouth (Patient not taking: Reported on 2025), Disp: , Rfl:     fluticasone (FLONASE) 50 mcg/act nasal spray, into each nostril (Patient not taking: Reported on 2025), Disp: , Rfl:     simvastatin (ZOCOR) 40 mg tablet, Take by mouth (Patient not taking: Reported on 2025), Disp: , Rfl:     OB History          4    Para   4    Term   4            AB        Living   4         SAB        IAB        Ectopic        Multiple        Live Births   4           Obstetric Comments   Menarche 14   Post menopausal late 50s                  Past Medical History:   Diagnosis Date    Disc displacement, lumbar     Essential hypertension     Heart murmur     Hyperlipidemia     Hypertension     Stroke (HCC) 2023    TIA    TIA (transient ischemic attack)         Past Surgical History:   Procedure Laterality Date    CATARACT EXTRACTION      CYSTOCELE REPAIR N/A     [place] XCAPSL CTRC RMVL INSJ IO LENS PROSTH W/O ECP Left 10/14/2024    Procedure: aborted cataract surgery with retained lense material in vitreous;  Surgeon: Jef Clayton MD;  Location: [facility] MAIN OR;  Service: Ophthalmology    SPINE SURGERY  2009    Lumbar spinal stenosis        Family History   Problem Relation Age of Onset    Heart attack Mother     Breast cancer Daughter     Colon cancer Neg Hx     Ovarian cancer Neg Hx         OBJECTIVE:    Visit Vitals  /80 (BP Location: Left arm, Patient Position: Sitting, Cuff Size: Standard)   Ht 4' 11\" (1.499 m)   Wt 63 kg (139 lb)   LMP  (LMP Unknown)   BMI 28.07 kg/m²   OB Status Postmenopausal   Smoking Status Never   BSA 1.58 m²       Physical Exam  Genitourinary:      Right Labia: rash.      Left Labia: rash.              No vaginal discharge.        Right Adnexa: not tender and no mass present.     Left Adnexa: not tender and no mass present.     No cervical discharge, friability or lesion.      " Urethral meatus caruncle present.  HENT:      Head: Normocephalic and atraumatic.   Neurological:      General: No focal deficit present.      Mental Status: She is oriented to person, place, and time.   Psychiatric:         Mood and Affect: Mood normal.         Behavior: Behavior normal.   Vitals and nursing note reviewed.       I have spent a total time of 30 minutes in caring for this patient on the day of the visit/encounter including Diagnostic results, Risks and benefits of tx options, Instructions for management, Patient and family education, Documenting in the medical record, Reviewing/placing orders in the medical record (including tests, medications, and/or procedures), and Obtaining or reviewing history  .

## 2025-03-13 ENCOUNTER — TELEPHONE (OUTPATIENT)
Dept: OBGYN CLINIC | Facility: CLINIC | Age: 89
End: 2025-03-13

## 2025-03-13 ENCOUNTER — TELEPHONE (OUTPATIENT)
Age: 89
End: 2025-03-13

## 2025-03-13 RX ORDER — NYSTATIN AND TRIAMCINOLONE ACETONIDE 100000; 1 [USP'U]/G; MG/G
1 OINTMENT TOPICAL 2 TIMES DAILY
Qty: 30 G | Refills: 0 | OUTPATIENT
Start: 2025-03-13

## 2025-03-13 NOTE — TELEPHONE ENCOUNTER
"Spoke with patient. Patient does not want to travel due to her age. She states she will \"hold tight\" for now.   "

## 2025-03-13 NOTE — TELEPHONE ENCOUNTER
Patient called and was very upset as she was referred to Ayesha Sawyer for urogyno. She is near the [facility] and wants someone near her. Please call patient with recommendations.

## 2025-03-13 NOTE — TELEPHONE ENCOUNTER
*OV NOTE NEEDS TO BE SIGNED* PA for Nystatin-triamcinolone (MYCOLOG-II) ointment SUBMITTED to [facility]    via    [x]CMM-KEY: [id number] Case ID #: [id number]  []Surescripts-Case ID #   []Availity-Auth ID #  []Faxed to plan   []Other website   []Phone call Case ID #    [x]PA sent as URGENT    All office notes, labs and other pertaining documents and studies sent. Clinical questions answered. Awaiting determination from insurance company.     Turnaround time for your insurance to make a decision on your Prior Authorization can take 7-21 business days.

## 2025-03-13 NOTE — TELEPHONE ENCOUNTER
Discussed this with her yesterday in the office. UroGyn is located in [place] or [place]. We do not have a urogyn specialist at the [facility]. It looked like  had an office on [address] in [place] and patient stated she would be comfortable going there. Can try  or  but they are in [place].

## 2025-03-19 DIAGNOSIS — N90.89 VULVAR IRRITATION: Primary | ICD-10-CM

## 2025-03-19 RX ORDER — TRIAMCINOLONE ACETONIDE 1 MG/G
OINTMENT TOPICAL 2 TIMES DAILY
Qty: 30 G | Refills: 0 | Status: SHIPPED | OUTPATIENT
Start: 2025-03-19

## 2025-03-19 RX ORDER — NYSTATIN 100000 U/G
CREAM TOPICAL 2 TIMES DAILY
Qty: 15 G | Refills: 0 | Status: SHIPPED | OUTPATIENT
Start: 2025-03-19

## 2025-03-19 NOTE — TELEPHONE ENCOUNTER
You can tell her to ignore the new script if she picked up the old one, they will just put it back on the shelf.

## 2025-03-19 NOTE — TELEPHONE ENCOUNTER
PA for (MYCOLOG-II) ointment DENIED    Reason:(Screenshot if applicable)        Message sent to office clinical pool Yes    Denial letter scanned into Media Yes    Appeal started No (Provider will need to decide if appeal is warranted and send clinical documentation to Prior Authorization Team for initiation.)    **Please follow up with your patient regarding denial and next steps**

## 2025-03-19 NOTE — TELEPHONE ENCOUNTER
Since the combination cream was not approved by insurance, I just sent in two scripts instead - one for the nystatin to treat a yeast infections and another for triamcinolone - a topical steroid to help with inflammation/irritation. She can use these together by mixing a small amount and applying to the vulva twice daily for 7 days. Thank you!

## 2025-04-15 ENCOUNTER — OFFICE VISIT (OUTPATIENT)
Dept: CARDIOLOGY CLINIC | Facility: CLINIC | Age: 89
End: 2025-04-15
Payer: MEDICARE

## 2025-04-15 VITALS
BODY MASS INDEX: 28.83 KG/M2 | OXYGEN SATURATION: 97 % | HEART RATE: 64 BPM | SYSTOLIC BLOOD PRESSURE: 108 MMHG | TEMPERATURE: 97.5 F | DIASTOLIC BLOOD PRESSURE: 58 MMHG | WEIGHT: 143 LBS | HEIGHT: 59 IN

## 2025-04-15 DIAGNOSIS — I10 ESSENTIAL HYPERTENSION: ICD-10-CM

## 2025-04-15 DIAGNOSIS — I48.0 PAROXYSMAL ATRIAL FIBRILLATION (HCC): ICD-10-CM

## 2025-04-15 DIAGNOSIS — I27.20 PULMONARY HYPERTENSION, UNSPECIFIED (HCC): ICD-10-CM

## 2025-04-15 DIAGNOSIS — N18.31 STAGE 3A CHRONIC KIDNEY DISEASE (HCC): ICD-10-CM

## 2025-04-15 DIAGNOSIS — E78.2 MIXED HYPERLIPIDEMIA: ICD-10-CM

## 2025-04-15 DIAGNOSIS — I35.0 NONRHEUMATIC AORTIC VALVE STENOSIS: ICD-10-CM

## 2025-04-15 PROCEDURE — 99214 OFFICE O/P EST MOD 30 MIN: CPT

## 2025-04-15 PROCEDURE — 93000 ELECTROCARDIOGRAM COMPLETE: CPT

## 2025-04-15 NOTE — PROGRESS NOTES
Chantelle Burgos  1935  [id number]  [facility]  [address] [facility] [address] [phone]    1. Mixed hyperlipidemia  Lipid Panel with Direct LDL reflex      2. Paroxysmal atrial fibrillation (HCC)  POCT ECG      3. Pulmonary hypertension, unspecified (HCC)        4. Stage 3a chronic kidney disease (HCC)        5. Essential hypertension        6. Nonrheumatic aortic valve stenosis            Summary/Discussion:  Aortic stenosis  - mild with mean gradient of 11 by echo in 12/2023  - no clinical s/s of volume overload. Not currently requiring any loop diuretics   - continue to monitor with serial echo at appropriate intervals     Pulmonary hypertension  - PASP 43 by echo  - asymptomatic     Paroxysmal atrial fibrillation  - EKG today demonstrating sinus rhythm   - echo (12/2023): LVEF 60%, no WMA, normal diastolic function, mild aortic stenosis, mild TR, PASP 43  - anticoagulation on xarelto 20 mg daily   creatinine 0.94  - continue metoprolol 50 mg twice daily     Hypertension  - 108/58  - continue metoprolol   - lifestyle modification   - close blood pressure monitoring     Hx of embolic CVA in 12/2023  - related to atrial fibrillation  - continue eliquis and statin     Dyslipidemia:  - Lipid Profile:    Latest Reference Range & Units 12/30/23 05:58   Cholesterol See Comment mg/dL 164   Triglycerides See Comment mg/dL 153 (H)   HDL >=50 mg/dL 50   LDL Calculated 0 - 100 mg/dL 83   - continue rosuvastatin 20 mg daily   - due for updated lipids-- order placed today    - encouraged low cholesterol diet and annual lipid follow up    CKD:  - creatinine as noted above       Interval History: Chantelle Burgos is a 89 y.o. year old female with history mentioned in problem list who presents to the office today for routine follow up.     Since her last office visit she does not express any significant cardiac complaints. She denies any chest pain/pressure/discomfort or  shortness of breath. She denies lower extremity edema, orthopnea, and PND. She denies lightheadedness, dizziness, and syncope. She denies palpitations.      No further cardiac testing indicated at this time.     She will RTO in 6 months with Dr. Jeter or sooner if necessary. She will call with any concerns.         Medical Problems       Problem List       Nonrheumatic aortic valve stenosis    Overview Signed 12/29/2023 11:11 PM by DARWIN Prabhakar   Formatting of this note might be different from the original.   Mild aortic stenosis         Essential hypertension    Mixed hyperlipidemia    Pulmonary hypertension, unspecified (HCC)    Overview Signed 12/29/2023 11:11 PM by DARWIN Prabhakar   Formatting of this note might be different from the original.   RVSP-59mmHg         Ventricular premature depolarization    Stroke (cerebrum) ([facility])    Multifocal strokes    Paroxysmal atrial fibrillation (HCC)    Stage 3a chronic kidney disease ([facility])    Lab Results   Component Value Date    EGFR 54 12/31/2023    EGFR 54 12/30/2023    EGFR 45 12/29/2023    CREATININE 0.94 12/31/2023    CREATININE 0.94 12/30/2023    CREATININE 1.10 12/29/2023         Urgency of urination    Overview Deleted 1/30/2025 10:30 AM by DARWIN Cheema              Past Medical History:   Diagnosis Date    Disc displacement, lumbar     Essential hypertension     Heart murmur     Hyperlipidemia     Hypertension     Stroke (HCC) 11/2023    TIA    TIA (transient ischemic attack)      Social History     Socioeconomic History    Marital status:      Spouse name: Not on file    Number of children: Not on file    Years of education: Not on file    Highest education level: Not on file   Occupational History    Not on file   Tobacco Use    Smoking status: Never    Smokeless tobacco: Never   Vaping Use    Vaping status: Never Used   Substance and Sexual Activity    Alcohol use: Not Currently     Comment: Denies    Drug use: Never     Comment:  Denies    Sexual activity: Not Currently     Partners: Male     Birth control/protection: Post-menopausal     Comment:    Other Topics Concern    Not on file   Social History Narrative    Not on file     Social Drivers of Health     Financial Resource Strain: Not on file   Food Insecurity: Not on file   Transportation Needs: Not on file   Physical Activity: Not on file   Stress: Not on file   Social Connections: Not on file   Intimate Partner Violence: Not on file   Housing Stability: Not on file      Family History   Problem Relation Age of Onset    Heart attack Mother     Breast cancer Daughter     Colon cancer Neg Hx     Ovarian cancer Neg Hx      Past Surgical History:   Procedure Laterality Date    CATARACT EXTRACTION      CYSTOCELE REPAIR N/A     [place] XCAPSL CTRC RMVL INSJ IO LENS PROSTH W/O ECP Left 10/14/2024    Procedure: aborted cataract surgery with retained lense material in vitreous;  Surgeon: Jef Clayton MD;  Location: [facility] MAIN OR;  Service: Ophthalmology    SPINE SURGERY  2009    Lumbar spinal stenosis       Current Outpatient Medications:     ascorbic acid (VITAMIN C) 500 MG tablet, Take 500 mg by mouth daily, Disp: , Rfl:     Cholecalciferol 50 MCG (2000 UT) CAPS, Take 1 capsule by mouth in the morning, Disp: , Rfl:     DULoxetine (CYMBALTA) 60 mg delayed release capsule, Take 60 mg by mouth daily, Disp: , Rfl:     ibuprofen (MOTRIN) 800 mg tablet, TAKE 1 TABLET EVERY 8 HOURS AS NEEDED FOR SEVERE PAIN, Disp: , Rfl:     metoprolol tartrate (LOPRESSOR) 50 mg tablet, Take 50 mg by mouth 2 (two) times a day, Disp: , Rfl:     nystatin (MYCOSTATIN) cream, Apply topically 2 (two) times a day For 7 days, Disp: 15 g, Rfl: 0    prednisoLONE acetate (PRED FORTE) 1 % ophthalmic suspension, Administer 1 drop to the right eye 4 (four) times a day, Disp: , Rfl:     rivaroxaban (Xarelto) 20 mg tablet, Take 1 tablet (20 mg total) by mouth daily with breakfast, Disp: 30 tablet, Rfl: 0    rosuvastatin  "(CRESTOR) 20 MG tablet, TAKE 1 TABLET BY MOUTH EVERY DAY *STOP PRAVASTATIN 80MG, Disp: , Rfl:     triamcinolone (KENALOG) 0.1 % ointment, Apply topically 2 (two) times a day For 7 days, Disp: 30 g, Rfl: 0    ALPRAZolam (Xanax) 0.25 mg tablet, Take by mouth (Patient not taking: Reported on 1/30/2025), Disp: , Rfl:     aspirin (ECOTRIN LOW STRENGTH) 81 mg EC tablet, Take by mouth (Patient not taking: Reported on 1/30/2025), Disp: , Rfl:     fluticasone (FLONASE) 50 mcg/act nasal spray, into each nostril (Patient not taking: Reported on 1/30/2025), Disp: , Rfl:     simvastatin (ZOCOR) 40 mg tablet, Take by mouth (Patient not taking: Reported on 1/30/2025), Disp: , Rfl:   Allergies   Allergen Reactions    Penicillins Hives    Sulfa Antibiotics Other (See Comments)     Lost her sense of smell    Tramadol Tachycardia       Labs:     Chemistry        Component Value Date/Time    K 4.0 12/31/2023 0615     12/31/2023 0615    CO2 27 12/31/2023 0615    BUN 23 12/31/2023 0615    CREATININE 0.94 12/31/2023 0615        Component Value Date/Time    CALCIUM 9.5 12/31/2023 0615    ALKPHOS 52 12/31/2023 0615    AST 22 12/31/2023 0615    ALT 19 12/31/2023 0615            No results found for: \"CHOL\"  Lab Results   Component Value Date    HDL 50 12/30/2023     Lab Results   Component Value Date    LDLCALC 83 12/30/2023     Lab Results   Component Value Date    TRIG 153 (H) 12/30/2023     No results found for: \"CHOLHDL\"    Imaging: No results found.    ECG:  sinus rhythm     Review of Systems   All other systems reviewed and are negative.      Vitals:    04/15/25 1129   BP: 108/58   Pulse: 64   Temp: 97.5 °F (36.4 °C)   SpO2: 97%     Vitals:    04/15/25 1129   Weight: 64.9 kg (143 lb)     Height: 4' 11\" (149.9 cm)   Body mass index is 28.88 kg/m².    Physical Exam  Vitals and nursing note reviewed.   Constitutional:       General: She is not in acute distress.     Appearance: Normal appearance.   HENT:      Head: Normocephalic.    "   Nose: Nose normal.      Mouth/Throat:      Mouth: Mucous membranes are moist.      Pharynx: Oropharynx is clear.   Cardiovascular:      Rate and Rhythm: Normal rate.      Heart sounds: Murmur heard.   Pulmonary:      Breath sounds: Decreased breath sounds present.   Musculoskeletal:         General: Normal range of motion.      Cervical back: Normal range of motion.      Right lower leg: No edema.      Left lower leg: No edema.   Skin:     General: Skin is warm.   Neurological:      Mental Status: She is alert and oriented to person, place, and time.   Psychiatric:         Mood and Affect: Mood normal.         Behavior: Behavior normal.

## 2025-05-30 ENCOUNTER — OFFICE VISIT (OUTPATIENT)
Dept: UROLOGY | Facility: CLINIC | Age: 89
End: 2025-05-30
Payer: MEDICARE

## 2025-05-30 VITALS
SYSTOLIC BLOOD PRESSURE: 148 MMHG | HEART RATE: 64 BPM | BODY MASS INDEX: 28.43 KG/M2 | DIASTOLIC BLOOD PRESSURE: 78 MMHG | HEIGHT: 59 IN | OXYGEN SATURATION: 99 % | WEIGHT: 141 LBS

## 2025-05-30 DIAGNOSIS — R39.15 URGENCY OF URINATION: Primary | ICD-10-CM

## 2025-05-30 LAB — POST-VOID RESIDUAL VOLUME, ML POC: 12 ML

## 2025-05-30 PROCEDURE — 51798 US URINE CAPACITY MEASURE: CPT

## 2025-05-30 PROCEDURE — 99213 OFFICE O/P EST LOW 20 MIN: CPT

## 2025-05-30 RX ORDER — SOLIFENACIN SUCCINATE 5 MG/1
5 TABLET, FILM COATED ORAL DAILY
Qty: 30 TABLET | Refills: 3 | Status: SHIPPED | OUTPATIENT
Start: 2025-05-30

## 2025-05-30 NOTE — PROGRESS NOTES
Name: Chantelle Burgos      : 1935      MRN: [id number]  Encounter Provider: DARWIN Justice  Encounter Date: 2025   Encounter department: [facility]  :  Assessment & Plan  Urgency of urination  - PVR 12 mL, indicative that patient is emptying bladder well.  - Bladder ultrasound normal.  - Initiate 5 mg daily Vesicare.  Side effect usage and profile discussed.  - Also discussed bladder Botox as well as PTNS.  - Patient will return in 3-months for symptom reevaluation.  - All questions addressed.  Please do not hesitate to reach out with further questions or concerns.        Orders:    POCT Measure PVR    solifenacin (VESICARE) 5 mg tablet; Take 1 tablet (5 mg total) by mouth daily        History of Present Illness   Chantelle Burgos is a 89 y.o. female who presents here with history of urgency and mixed stress and urge urinary incontinence presenting for follow-up. She wears 1-2 pads per day. She states that her leakage is mostly with urgency.  She also continues to have frequency.  She denies dysuria,  and gross hematuria. She does report that she is having regular bowel movements and maintaining proper hydration.       UC grew 40-49,000 staphylococcus aureus, 30-39,000 Group B Strep 25.     No recent UTIs.          Review of Systems   Constitutional:  Negative for chills and fever.   HENT:  Negative for ear pain and sore throat.    Eyes:  Negative for pain and visual disturbance.   Respiratory:  Negative for cough and shortness of breath.    Cardiovascular:  Negative for chest pain and palpitations.   Gastrointestinal:  Negative for abdominal pain, constipation, nausea and vomiting.   Genitourinary:  Positive for frequency and urgency. Negative for difficulty urinating, dysuria, flank pain and hematuria.        Urge incontinence   Musculoskeletal:  Negative for arthralgias and back pain.   Skin:  Negative for color change and rash.   Neurological:  Negative for seizures  "and syncope.   All other systems reviewed and are negative.         Objective   /78 (BP Location: Left arm, Patient Position: Sitting, Cuff Size: Adult)   Pulse 64   Ht 4' 11\" (1.499 m)   Wt 64 kg (141 lb)   LMP  (LMP Unknown)   SpO2 99%   BMI 28.48 kg/m²     Physical Exam  Vitals reviewed.   Constitutional:       General: She is not in acute distress.     Appearance: She is well-developed.   HENT:      Head: Normocephalic and atraumatic.     Eyes:      Conjunctiva/sclera: Conjunctivae normal.     Pulmonary:      Effort: Pulmonary effort is normal. No respiratory distress.     Neurological:      Mental Status: She is alert and oriented to person, place, and time.     Psychiatric:         Mood and Affect: Mood normal.        Results   No results found for: \"PSA\"  Lab Results   Component Value Date    CALCIUM 9.5 12/31/2023    K 4.0 12/31/2023    CO2 27 12/31/2023     12/31/2023    BUN 23 12/31/2023    CREATININE 0.94 12/31/2023     Lab Results   Component Value Date    WBC 6.37 12/31/2023    HGB 13.5 12/31/2023    HCT 40.9 12/31/2023    MCV 99 (H) 12/31/2023     12/31/2023       Office Urine Dip  No results found for this or any previous visit (from the past hour).      "

## 2025-05-30 NOTE — ASSESSMENT & PLAN NOTE
- PVR 12 mL, indicative that patient is emptying bladder well.  - Bladder ultrasound normal.  - Initiate 5 mg daily Vesicare.  Side effect usage and profile discussed.  - Also discussed bladder Botox as well as PTNS.  - Patient will return in 3-months for symptom reevaluation.  - All questions addressed.  Please do not hesitate to reach out with further questions or concerns.        Orders:    POCT Measure PVR    solifenacin (VESICARE) 5 mg tablet; Take 1 tablet (5 mg total) by mouth daily

## 2025-06-25 DIAGNOSIS — R39.15 URGENCY OF URINATION: ICD-10-CM

## 2025-06-25 RX ORDER — SOLIFENACIN SUCCINATE 5 MG/1
5 TABLET, FILM COATED ORAL DAILY
Qty: 90 TABLET | Refills: 0 | Status: SHIPPED | OUTPATIENT
Start: 2025-06-25

## (undated) DEVICE — VRM4 10-0 BK MONO NYL 4"/10 CM: Brand: VRM4 10-0 BK MONO NYL 4"/10 CM

## (undated) DEVICE — CENTURION VISION SYSTEM FMS

## (undated) DEVICE — Device: Brand: MALYUGIN RING SYSTEM 6.25MM

## (undated) DEVICE — GLOVE SRG BIOGEL 7.5

## (undated) DEVICE — STERILE UV AND BLUE LIGHT FILTERING ACRYLIC FOLDABLE ASPHERIC POSTERIOR CHAMBER INTRAOCULAR LENS
Type: IMPLANTABLE DEVICE | Site: EYE | Status: NON-FUNCTIONAL
Brand: CLAREON

## (undated) DEVICE — THE MONARCH® "D" CARTRIDGE IS A SINGLE-USE POLYPROPYLENE CARTRIDGE FOR POSTERIOR CHAMBER IOL DELIVERY: Brand: MONARCH® III

## (undated) DEVICE — CLEARCUT® SLIT KNIFE INTREPID MICRO-COAXIAL SYSTEM 2.4 SB: Brand: CLEARCUT®; INTREPID

## (undated) DEVICE — INTREPID® TRANSFORMER IA HP: Brand: INTREPID®

## (undated) DEVICE — ACTIVE FMS W/ INTREPID* ULTRA SLEEVES, 0.9MM 45° ABS* INTREPID* BALANCED TIP: Brand: ALCON

## (undated) DEVICE — AIR INJECT CANNULA 27GA: Brand: OPHTHALMIC CANNULA

## (undated) DEVICE — EYE PACK CUSTOM -FINNEGAN

## (undated) DEVICE — MICROSURGICAL INSTRUMENT IRR. CYSTITOME 25GA STRAIGHT-REVERSE CUTTING: Brand: ALCON

## (undated) DEVICE — B-H IRRIGATING CAN 19GA FLAT ANGLED 8MM: Brand: OPHTHALMIC CANNULA